# Patient Record
Sex: MALE | Race: BLACK OR AFRICAN AMERICAN | NOT HISPANIC OR LATINO | URBAN - METROPOLITAN AREA
[De-identification: names, ages, dates, MRNs, and addresses within clinical notes are randomized per-mention and may not be internally consistent; named-entity substitution may affect disease eponyms.]

---

## 2017-01-06 ENCOUNTER — EMERGENCY (EMERGENCY)
Facility: HOSPITAL | Age: 52
LOS: 1 days | Discharge: HOME | End: 2017-01-06
Attending: EMERGENCY MEDICINE | Admitting: EMERGENCY MEDICINE
Payer: MEDICAID

## 2017-01-06 VITALS
RESPIRATION RATE: 17 BRPM | HEIGHT: 69 IN | DIASTOLIC BLOOD PRESSURE: 86 MMHG | WEIGHT: 169.98 LBS | TEMPERATURE: 98 F | HEART RATE: 82 BPM | OXYGEN SATURATION: 100 % | SYSTOLIC BLOOD PRESSURE: 148 MMHG

## 2017-01-06 DIAGNOSIS — Z98.89 OTHER SPECIFIED POSTPROCEDURAL STATES: Chronic | ICD-10-CM

## 2017-01-06 DIAGNOSIS — M62.830 MUSCLE SPASM OF BACK: ICD-10-CM

## 2017-01-06 DIAGNOSIS — Z79.899 OTHER LONG TERM (CURRENT) DRUG THERAPY: ICD-10-CM

## 2017-01-06 DIAGNOSIS — M54.9 DORSALGIA, UNSPECIFIED: ICD-10-CM

## 2017-01-06 DIAGNOSIS — Z88.0 ALLERGY STATUS TO PENICILLIN: ICD-10-CM

## 2017-01-06 PROCEDURE — 99284 EMERGENCY DEPT VISIT MOD MDM: CPT

## 2017-01-06 RX ORDER — BENZTROPINE MESYLATE 1 MG
1 TABLET ORAL ONCE
Qty: 0 | Refills: 0 | Status: COMPLETED | OUTPATIENT
Start: 2017-01-06 | End: 2017-01-06

## 2017-01-06 RX ORDER — KETOROLAC TROMETHAMINE 30 MG/ML
60 SYRINGE (ML) INJECTION ONCE
Qty: 0 | Refills: 0 | Status: DISCONTINUED | OUTPATIENT
Start: 2017-01-06 | End: 2017-01-06

## 2017-01-06 RX ADMIN — Medication 60 MILLIGRAM(S): at 21:35

## 2017-01-06 NOTE — ED PROVIDER NOTE - OBJECTIVE STATEMENT
52 y/o M with PMH of Gastritis, OA, ETOH abuse, Drug abuse, Schizoaffective D/O and Bipolar D/O (on Seroquel and Sertraline) presents c/o atraumatic "lower back spasms" x 1 day. Sx's began while pt was at rest this morning. Worse when ambulating, no alleviating factors. Has not taken and meds for his sx's PTA. States he has had similar episodes in the past with spontaneous resolution.    Denies fever, chills, dizziness, headache, CP, SOB, palpitations, abdo pain, N/V/D, dysuria, hematuria, urinary or bowel incontinence, LE numbness or focal weakness, recent injury/trauma 52 y/o M with PMH of Gastritis, OA, ETOH abuse, Drug abuse, Schizoaffective D/O and Bipolar D/O (on Seroquel and Sertraline) presents c/o atraumatic "lower back spasms" x 1 day. Sx's began while pt was at rest this morning. Worse when ambulating, no alleviating factors. Has not taken and meds for his sx's PTA. States he has had similar episodes in the past in which he was told it was a "neurologic problem in the brain".     Denies fever, chills, dizziness, headache, CP, SOB, palpitations, abdo pain, N/V/D, dysuria, hematuria, urinary or bowel incontinence, LE numbness or focal weakness, recent injury/trauma

## 2017-01-06 NOTE — ED PROVIDER NOTE - MEDICAL DECISION MAKING DETAILS
Pt improved with meds and now reports feeling much better. A&Ox3. NAD. Ambulates steady without difficulty. No other complaints at this time. Will D/C with instructions to F/U with PMD or Dr. Thompson this week. Strict return precautions reviewed with pt in which pt verbalizes understanding and agrees to.

## 2017-01-06 NOTE — ED PROVIDER NOTE - PMH
Alcohol abuse    Arthritis    Bipolar disorder    Drug abuse  K2  Gastritis    Schizoaffective disorder

## 2017-01-07 VITALS
OXYGEN SATURATION: 97 % | DIASTOLIC BLOOD PRESSURE: 83 MMHG | RESPIRATION RATE: 16 BRPM | TEMPERATURE: 98 F | HEART RATE: 90 BPM | SYSTOLIC BLOOD PRESSURE: 137 MMHG

## 2017-01-07 RX ADMIN — Medication 1 MILLIGRAM(S): at 02:53

## 2017-12-18 ENCOUNTER — EMERGENCY (EMERGENCY)
Facility: HOSPITAL | Age: 52
LOS: 1 days | Discharge: HOME | End: 2017-12-18
Attending: EMERGENCY MEDICINE | Admitting: EMERGENCY MEDICINE
Payer: MEDICAID

## 2017-12-18 VITALS
RESPIRATION RATE: 16 BRPM | TEMPERATURE: 98 F | OXYGEN SATURATION: 99 % | SYSTOLIC BLOOD PRESSURE: 121 MMHG | DIASTOLIC BLOOD PRESSURE: 60 MMHG | HEART RATE: 98 BPM

## 2017-12-18 DIAGNOSIS — R41.82 ALTERED MENTAL STATUS, UNSPECIFIED: ICD-10-CM

## 2017-12-18 DIAGNOSIS — Z79.899 OTHER LONG TERM (CURRENT) DRUG THERAPY: ICD-10-CM

## 2017-12-18 DIAGNOSIS — F10.129 ALCOHOL ABUSE WITH INTOXICATION, UNSPECIFIED: ICD-10-CM
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
## 2017-12-18 DIAGNOSIS — Z88.0 ALLERGY STATUS TO PENICILLIN: ICD-10-CM

## 2017-12-18 DIAGNOSIS — Z98.89 OTHER SPECIFIED POSTPROCEDURAL STATES: Chronic | ICD-10-CM

## 2017-12-18 LAB
ALBUMIN SERPL ELPH-MCNC: 4 G/DL — SIGNIFICANT CHANGE UP (ref 3.4–5)
ALP SERPL-CCNC: 70 U/L — SIGNIFICANT CHANGE UP (ref 40–120)
ALT FLD-CCNC: 60 U/L — HIGH (ref 12–42)
ANION GAP SERPL CALC-SCNC: 14 MMOL/L — SIGNIFICANT CHANGE UP (ref 9–16)
AST SERPL-CCNC: 55 U/L — HIGH (ref 15–37)
BILIRUB SERPL-MCNC: 0.3 MG/DL — SIGNIFICANT CHANGE UP (ref 0.2–1.2)
BUN SERPL-MCNC: 9 MG/DL — SIGNIFICANT CHANGE UP (ref 7–23)
CALCIUM SERPL-MCNC: 8.9 MG/DL — SIGNIFICANT CHANGE UP (ref 8.5–10.5)
CHLORIDE SERPL-SCNC: 106 MMOL/L — SIGNIFICANT CHANGE UP (ref 96–108)
CO2 SERPL-SCNC: 26 MMOL/L — SIGNIFICANT CHANGE UP (ref 22–31)
CREAT SERPL-MCNC: 0.72 MG/DL — SIGNIFICANT CHANGE UP (ref 0.5–1.3)
ETHANOL SERPL-MCNC: 347 MG/DL — HIGH
GLUCOSE BLDC GLUCOMTR-MCNC: 67 MG/DL — LOW (ref 70–99)
GLUCOSE SERPL-MCNC: 68 MG/DL — LOW (ref 70–99)
POTASSIUM SERPL-MCNC: 3.7 MMOL/L — SIGNIFICANT CHANGE UP (ref 3.5–5.3)
POTASSIUM SERPL-SCNC: 3.7 MMOL/L — SIGNIFICANT CHANGE UP (ref 3.5–5.3)
PROT SERPL-MCNC: 8 G/DL — SIGNIFICANT CHANGE UP (ref 6.4–8.2)
SODIUM SERPL-SCNC: 146 MMOL/L — HIGH (ref 132–145)

## 2017-12-18 PROCEDURE — 99284 EMERGENCY DEPT VISIT MOD MDM: CPT

## 2017-12-18 RX ORDER — DEXTROSE 50 % IN WATER 50 %
25 SYRINGE (ML) INTRAVENOUS ONCE
Qty: 0 | Refills: 0 | Status: COMPLETED | OUTPATIENT
Start: 2017-12-18 | End: 2017-12-18

## 2017-12-18 RX ADMIN — Medication 25 MILLILITER(S): at 21:31

## 2017-12-18 NOTE — ED PROVIDER NOTE - UNABLE TO OBTAIN
Arrives with altered mental status. Patient unable to provide a history or cooperate with physical exam. Urgent need for Intervention

## 2017-12-18 NOTE — ED PROVIDER NOTE - OBJECTIVE STATEMENT
Arrives with altered mental status. Patient unable to provide a history or cooperate with physical exam.

## 2017-12-19 VITALS
DIASTOLIC BLOOD PRESSURE: 82 MMHG | SYSTOLIC BLOOD PRESSURE: 127 MMHG | OXYGEN SATURATION: 97 % | TEMPERATURE: 98 F | RESPIRATION RATE: 16 BRPM | HEART RATE: 83 BPM

## 2017-12-19 PROBLEM — F25.9 SCHIZOAFFECTIVE DISORDER, UNSPECIFIED: Chronic | Status: ACTIVE | Noted: 2017-01-06

## 2017-12-19 PROBLEM — F31.9 BIPOLAR DISORDER, UNSPECIFIED: Chronic | Status: ACTIVE | Noted: 2017-01-06

## 2017-12-19 PROBLEM — K29.70 GASTRITIS, UNSPECIFIED, WITHOUT BLEEDING: Chronic | Status: ACTIVE | Noted: 2017-01-06

## 2017-12-19 LAB
GLUCOSE BLDC GLUCOMTR-MCNC: 72 MG/DL — SIGNIFICANT CHANGE UP (ref 70–99)
GLUCOSE BLDC GLUCOMTR-MCNC: 81 MG/DL — SIGNIFICANT CHANGE UP (ref 70–99)

## 2018-01-02 ENCOUNTER — EMERGENCY (EMERGENCY)
Facility: HOSPITAL | Age: 53
LOS: 1 days | Discharge: HOME | End: 2018-01-02
Attending: EMERGENCY MEDICINE | Admitting: EMERGENCY MEDICINE
Payer: MEDICAID

## 2018-01-02 VITALS
RESPIRATION RATE: 17 BRPM | HEART RATE: 78 BPM | SYSTOLIC BLOOD PRESSURE: 139 MMHG | DIASTOLIC BLOOD PRESSURE: 80 MMHG | OXYGEN SATURATION: 99 %

## 2018-01-02 VITALS
SYSTOLIC BLOOD PRESSURE: 144 MMHG | DIASTOLIC BLOOD PRESSURE: 78 MMHG | RESPIRATION RATE: 18 BRPM | TEMPERATURE: 98 F | HEART RATE: 81 BPM | OXYGEN SATURATION: 98 %

## 2018-01-02 DIAGNOSIS — F10.129 ALCOHOL ABUSE WITH INTOXICATION, UNSPECIFIED: ICD-10-CM

## 2018-01-02 DIAGNOSIS — Z98.89 OTHER SPECIFIED POSTPROCEDURAL STATES: Chronic | ICD-10-CM

## 2018-01-02 DIAGNOSIS — Z79.899 OTHER LONG TERM (CURRENT) DRUG THERAPY: ICD-10-CM

## 2018-01-02 DIAGNOSIS — Z88.0 ALLERGY STATUS TO PENICILLIN: ICD-10-CM

## 2018-01-02 DIAGNOSIS — R41.82 ALTERED MENTAL STATUS, UNSPECIFIED: ICD-10-CM

## 2018-01-02 PROCEDURE — 99284 EMERGENCY DEPT VISIT MOD MDM: CPT

## 2018-01-02 RX ORDER — DIVALPROEX SODIUM 500 MG/1
0 TABLET, DELAYED RELEASE ORAL
Qty: 0 | Refills: 0 | COMMUNITY

## 2018-01-02 RX ORDER — QUETIAPINE FUMARATE 200 MG/1
0 TABLET, FILM COATED ORAL
Qty: 0 | Refills: 0 | COMMUNITY

## 2018-01-02 NOTE — ED PROVIDER NOTE - OBJECTIVE STATEMENT
Patient BIBEMS from suspected alcohol intoxication without seizure, vomiting, abnormal vitals. Previous ED visits related to alcohol intoxication.  History and ROS limited due to current state
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
## 2018-01-13 NOTE — EKG
Test Reason : 

Blood Pressure : ***/*** mmHG

Vent. Rate : 083 BPM     Atrial Rate : 083 BPM

   P-R Int : 148 ms          QRS Dur : 076 ms

    QT Int : 394 ms       P-R-T Axes : 039 054 047 degrees

   QTc Int : 462 ms

 

NORMAL SINUS RHYTHM WITH SINUS ARRHYTHMIA

MODERATE VOLTAGE CRITERIA FOR LVH, MAY BE NORMAL VARIANT

CANNOT RULE OUT SEPTAL INFARCT , AGE UNDETERMINED

ABNORMAL ECG

NO PREVIOUS ECGS AVAILABLE

Confirmed by SHERLEY ISIDRO MD ([id number]) on 1/13/2018 4:58:07 PM

 

Referred By:             Confirmed By:SHERLEY ISIDRO MD

## 2018-01-13 NOTE — PN
S CIWA





- CIWA Score


Nausea/Vomitin


Muscle Tremors: 3


Anxiety: 3


Agitation: 2


Paroxysmal Sweats: 3


Orientation: 0-Oriented


Tacttile Disturbances: 1-Very Mild Itch/Numbness


Auditory Disturbances: 1-Very Mild


Visual Disturbances: 1-Very Mild Sensitivity


Headache: 2-Mild


CIWA-Ar Total Score: 18





[facility] Progress Note (SOAP)


Subjective: 





Shakes, sweats, irritability, abdominal cramps and leg pain


Objective: 





18 10:32


 Vital Signs











  18





  03:26 06:22 09:06


 


Temperature  97.7 F 96.3 F L


 


Pulse Rate  77 120 H


 


Respiratory 18 18 20





Rate   


 


Blood Pressure  117/73 128/87








 Laboratory Last Values











Urine Color  Yellow   18  21:00    


 


Urine Appearance  Turbid   18  21:00    


 


Urine pH  5.0  (5.0-8.0)   18  21:00    


 


Ur Specific Gravity  1.019  (1.001-1.035)   18  21:00    


 


Urine Protein  Negative  (NEGATIVE)   18  21:00    


 


Urine Glucose (UA)  Negative  (NEGATIVE)   18  21:00    


 


Urine Ketones  Trace  (NEGATIVE)  H  18  21:00    


 


Urine Blood  Negative  (NEGATIVE)   18  21:00    


 


Urine Nitrite  Negative  (NEGATIVE)   18  21:00    


 


Urine Bilirubin  Negative  (NEGATIVE)   18  21:00    


 


Urine Urobilinogen  2.0 mg/dL (0.2-1.0)   18  21:00    


 


Ur Leukocyte Esterase  Negative  (NEGATIVE)   18  21:00    








UA noted, labs pending


Assessment: 





18 10:32


Withdrawal sx


Plan: 





Continue detox

## 2018-01-14 NOTE — PN
Psychiatric Progress Note


Vital Signs: 


 Vital Signs











 Period  Temp  Pulse  Resp  BP Sys/Toscano  Pulse Ox


 


 Last 24 Hr  97.2 F-98.3 F  67-96  16-20  108-135/62-80  











Date of Session: 01/14/18


Chief Complaint:: Follow up


HPI: Patient with history of Schizoaffective Disorder, pplysubstance dependence 

admitted on 1/12/12 for alcohol detoxification


Current Medications: 


Active Medications











Generic Name Dose Route Start Last Admin





  Trade Name Freq  PRN Reason Stop Dose Admin


 


Acetaminophen  650 mg  01/12/18 14:28  





  Tylenol -  PO   





  Q4H PRN   





  FEVER   


 


Al Hydroxide/Mg Hydroxide  30 ml  01/12/18 14:28  





  Mylanta Oral Suspension -  PO   





  Q6H PRN   





  DYSPEPSIA   


 


Chlordiazepoxide HCl  25 mg  01/13/18 17:00  01/14/18 06:22





  Librium -  PO  01/14/18 11:01  25 mg





  [id number] MARYCARMEN   Administration


 


Chlordiazepoxide HCl  15 mg  01/14/18 17:00  





  Librium -  PO  01/15/18 11:01  





  [id number] MARYCARMEN   


 


Chlordiazepoxide HCl  25 mg  01/12/18 14:28  01/13/18 07:29





  Librium -  PO  01/15/18 14:27  25 mg





  Q4H PRN   Administration





  WITHDRAWAL(CONT SUBST)   


 


Chlordiazepoxide HCl  10 mg  01/15/18 17:00  





  Librium -  PO  01/16/18 11:01  





  [id number] MARYCARMEN   


 


Divalproex Sodium  250 mg  01/13/18 10:00  01/13/18 22:13





  Depakote -  PO   250 mg





  BID MARYCARMEN   Administration


 


Docusate Sodium  100 mg  01/12/18 22:00  01/14/18 06:23





  Colace -  PO   100 mg





  TID MARYCARMEN   Administration


 


Eucalyptus/Menthol/Phenol/Sorbitol  1 each  01/12/18 14:28  





  Cepastat Lozenge -  MM   





  Q4H PRN   





  SORE THROAT   


 


Guaifenesin  10 ml  01/12/18 14:28  





  Robitussin Dm -  PO   





  Q6H PRN   





  COUGH   


 


Hydroxyzine Pamoate  50 mg  01/12/18 14:28  01/13/18 22:19





  Vistaril -  PO   50 mg





  Q4H PRN   Administration





  AGITATION   


 


Ibuprofen  400 mg  01/12/18 14:28  01/14/18 06:23





  Motrin -  PO   400 mg





  Q6H PRN   Administration





  PAIN LEVEL 4-6   


 


Loperamide HCl  4 mg  01/12/18 14:28  





  Imodium -  PO   





  Q6H PRN   





  DIARRHEA   


 


Magnesium Citrate  300 ml  01/12/18 14:28  





  Citroma -  PO   





  Q48H PRN   





  CONSTIPATION   


 


Magnesium Hydroxide  30 ml  01/12/18 14:28  





  Milk Of Magnesia -  PO   





  DAILY PRN   





  CONSTIPATION   


 


Pantoprazole Sodium  40 mg  01/14/18 07:00  01/14/18 08:19





  Protonix -  PO   40 mg





  DAILY@0700 MARYCARMEN   Administration


 


Prenatal Multivit/Folic Acid/Iron  1 tab  01/13/18 10:00  01/13/18 10:05





  Prenatal Vitamins (Sjr) -  PO   1 tab





  DAILY MARYCARMEN   Administration


 


Pseudoephedrine/Triprolidine  1 combo  01/12/18 14:28  





  Actifed -  PO   





  TID PRN   





  NASAL CONGESTION   


 


Quetiapine Fumarate  200 mg  01/12/18 22:00  01/13/18 22:13





  Seroquel -  PO   200 mg





  HS MARYCARMEN   Administration


 


Sertraline HCl  100 mg  01/13/18 10:00  01/13/18 10:05





  Zoloft -  PO   100 mg





  DAILY MARYCARMEN   Administration


 


Thiamine HCl  100 mg  01/12/18 22:00  01/13/18 22:13





  Vitamin B1 -  PO   100 mg





  HS MARYCARMEN   Administration











Medication(s) Change(s): Increase Seroquel dosage to 100 mg daily & 300 mg HS


Current Side Effect: No


Lab tests ordered: Yes


Lab tests reviewed: Yes


Provider note:: Patient requests that his Seroquel dosage be adjusted. He said 

that he was on Seroquel 200 mg daily & 400 mg HS prior to current admission. He 

saw a  psychiatrist on admission who ordered Seroquel 200 mg po HS with the 

promise to gradually raise dosage to prior to admission dose. He complains that 

he is still geeting 200 mg po HS. Requests that Seroquel be increased to 100 mg 

daily & 300 mg HS


Total face to face time:: 25





Mental Status Exam





- Mental Status Exam


Alert and Oriented to: Time, Place, Person


Cognitive Function: Fair


Mood: Hopeful, Euthymic


Affect: Appropriate


Patient Behavior: Cooperative


Speech Pattern: Clear


Voice Loudness: Normal


Thought Process: Intact, Goal Oriented


Hallucinations: Denies


Homicidal Ideation: Denies


Insight/Judgement: Fair, Poor


Sleep: Poorly


Appetite: Good


Muscle strength/Tone: Normal


Gait/Station: Normal





Psychiatric Treatment Plan





- Problem List


(1) Schizoaffective disorder


Current Visit: Yes   





(2) Alcohol dependence with uncomplicated withdrawal


Current Visit: Yes   





(3) Cannabis dependence


Current Visit: Yes   





(4) Nicotine dependence


Current Visit: Yes   


Qualifiers: 


   Nicotine product type: cigarettes   Substance use status: uncomplicated   

Qualified Code(s): F17.210 - Nicotine dependence, cigarettes, uncomplicated   


Initial treatment plan: 1) Discontinue Seroquel 200 mg po HS.  2) Start 

Seroquel 100 mg daily & 300 mg HS.  3) Continue inpatient detoxification

## 2018-01-15 NOTE — PN
[facility]HS Progress Note (SOAP)


Subjective: 





ANXIETY,SWEATS, ITCHY RASH BOTH SHOULDERS.


Objective: 





01/15/18 11:43


 Vital Signs











Temperature  96.4 F L  01/15/18 09:19


 


Pulse Rate  100 H  01/15/18 09:19


 


Respiratory Rate  18   01/15/18 09:19


 


Blood Pressure  126/76   01/15/18 09:19


 


O2 Sat by Pulse Oximetry (%)      








 Laboratory Last Values











WBC  5.2 K/mm3 (4.0-10.0)   01/13/18  06:00    


 


RBC  4.11 M/mm3 (4.00-5.60)   01/13/18  06:00    


 


Hgb  12.7 GM/dL (11.7-16.9)   01/13/18  06:00    


 


Hct  38.5 % (35.4-49)   01/13/18  06:00    


 


MCV  93.7 fl (80-96)   01/13/18  06:00    


 


MCH  31.0 pg (25.7-33.7)   01/13/18  06:00    


 


MCHC  33.0 g/dl (32.0-35.9)   01/13/18  06:00    


 


RDW  15.8 % (11.9-15.9)   01/13/18  06:00    


 


Plt Count  160 K/MM3 (134-434)   01/13/18  06:00    


 


MPV  9.7 fl (7.5-11.1)   01/13/18  06:00    


 


Sodium  136 mmol/L (136-145)   01/13/18  06:00    


 


Potassium  4.0 mmol/L (3.5-5.1)   01/13/18  06:00    


 


Chloride  100 mmol/L ()   01/13/18  06:00    


 


Carbon Dioxide  28 mmol/L (21-32)   01/13/18  06:00    


 


Anion Gap  8  (8-16)   01/13/18  06:00    


 


BUN  10 mg/dL (7-18)  D 01/13/18  06:00    


 


Creatinine  0.8 mg/dL (0.7-1.3)   01/13/18  06:00    


 


Creat Clearance w eGFR  > 60  (>60)   01/13/18  06:00    


 


Random Glucose  112 mg/dL ()  H  01/13/18  06:00    


 


Calcium  8.4 mg/dL (8.5-10.1)  L  01/13/18  06:00    


 


Total Bilirubin  0.6 mg/dL (0.2-1.0)   01/13/18  06:00    


 


AST  55 U/L (15-37)  H D 01/13/18  06:00    


 


ALT  68 U/L (12-78)  D 01/13/18  06:00    


 


Alkaline Phosphatase  81 U/L ()  D 01/13/18  06:00    


 


Total Protein  8.1 g/dl (6.4-8.2)   01/13/18  06:00    


 


Albumin  4.4 g/dl (3.4-5.0)   01/13/18  06:00    


 


Urine Color  Yellow   01/12/18  21:00    


 


Urine Appearance  Turbid   01/12/18  21:00    


 


Urine pH  5.0  (5.0-8.0)   01/12/18  21:00    


 


Ur Specific Gravity  1.019  (1.001-1.035)   01/12/18  21:00    


 


Urine Protein  Negative  (NEGATIVE)   01/12/18  21:00    


 


Urine Glucose (UA)  Negative  (NEGATIVE)   01/12/18  21:00    


 


Urine Ketones  Trace  (NEGATIVE)  H  01/12/18  21:00    


 


Urine Blood  Negative  (NEGATIVE)   01/12/18  21:00    


 


Urine Nitrite  Negative  (NEGATIVE)   01/12/18  21:00    


 


Urine Bilirubin  Negative  (NEGATIVE)   01/12/18  21:00    


 


Urine Urobilinogen  2.0 mg/dL (0.2-1.0)   01/12/18  21:00    


 


Ur Leukocyte Esterase  Negative  (NEGATIVE)   01/12/18  21:00    


 


Valproic Acid  < 3.000 ug/ml ()  L  01/13/18  09:00    


 


RPR Titer  Nonreactive  (NONREACTIVE)   01/13/18  06:00    











Assessment: 





01/15/18 11:43


WITHDRAWAL SX


Plan: 





CONTINUE DETOX

## 2018-01-16 NOTE — DS
[facility] Detox Discharge Summary


Admission Date: 


01/12/18





Discharge Date: 01/16/18





- History


Present History: Alcohol Dependence, Cannabis Dependence


Additional Comments: 





DETOX COMPLETED. ALERT O X 3. NAD.


Pertinent Past History: 





SEE DX BELOW





- Physical Exam Results


Vital Signs: 


 Vital Signs











Temperature  96.2 F L  01/16/18 10:08


 


Pulse Rate  95 H  01/16/18 10:08


 


Respiratory Rate  18   01/16/18 10:08


 


Blood Pressure  133/87   01/16/18 10:08


 


O2 Sat by Pulse Oximetry (%)      











Pertinent Admission Physical Exam Findings: 





WITHDRAWAL SX 


 Laboratory Last Values











WBC  5.2 K/mm3 (4.0-10.0)   01/13/18  06:00    


 


RBC  4.11 M/mm3 (4.00-5.60)   01/13/18  06:00    


 


Hgb  12.7 GM/dL (11.7-16.9)   01/13/18  06:00    


 


Hct  38.5 % (35.4-49)   01/13/18  06:00    


 


MCV  93.7 fl (80-96)   01/13/18  06:00    


 


MCH  31.0 pg (25.7-33.7)   01/13/18  06:00    


 


MCHC  33.0 g/dl (32.0-35.9)   01/13/18  06:00    


 


RDW  15.8 % (11.9-15.9)   01/13/18  06:00    


 


Plt Count  160 K/MM3 (134-434)   01/13/18  06:00    


 


MPV  9.7 fl (7.5-11.1)   01/13/18  06:00    


 


Sodium  136 mmol/L (136-145)   01/13/18  06:00    


 


Potassium  4.0 mmol/L (3.5-5.1)   01/13/18  06:00    


 


Chloride  100 mmol/L ()   01/13/18  06:00    


 


Carbon Dioxide  28 mmol/L (21-32)   01/13/18  06:00    


 


Anion Gap  8  (8-16)   01/13/18  06:00    


 


BUN  10 mg/dL (7-18)  D 01/13/18  06:00    


 


Creatinine  0.8 mg/dL (0.7-1.3)   01/13/18  06:00    


 


Creat Clearance w eGFR  > 60  (>60)   01/13/18  06:00    


 


Random Glucose  112 mg/dL ()  H  01/13/18  06:00    


 


Calcium  8.4 mg/dL (8.5-10.1)  L  01/13/18  06:00    


 


Total Bilirubin  0.6 mg/dL (0.2-1.0)   01/13/18  06:00    


 


AST  55 U/L (15-37)  H D 01/13/18  06:00    


 


ALT  68 U/L (12-78)  D 01/13/18  06:00    


 


Alkaline Phosphatase  81 U/L ()  D 01/13/18  06:00    


 


Total Protein  8.1 g/dl (6.4-8.2)   01/13/18  06:00    


 


Albumin  4.4 g/dl (3.4-5.0)   01/13/18  06:00    


 


Urine Color  Yellow   01/12/18  21:00    


 


Urine Appearance  Turbid   01/12/18  21:00    


 


Urine pH  5.0  (5.0-8.0)   01/12/18  21:00    


 


Ur Specific Gravity  1.019  (1.001-1.035)   01/12/18  21:00    


 


Urine Protein  Negative  (NEGATIVE)   01/12/18  21:00    


 


Urine Glucose (UA)  Negative  (NEGATIVE)   01/12/18  21:00    


 


Urine Ketones  Trace  (NEGATIVE)  H  01/12/18  21:00    


 


Urine Blood  Negative  (NEGATIVE)   01/12/18  21:00    


 


Urine Nitrite  Negative  (NEGATIVE)   01/12/18  21:00    


 


Urine Bilirubin  Negative  (NEGATIVE)   01/12/18  21:00    


 


Urine Urobilinogen  2.0 mg/dL (0.2-1.0)   01/12/18  21:00    


 


Ur Leukocyte Esterase  Negative  (NEGATIVE)   01/12/18  21:00    


 


Valproic Acid  < 3.000 ug/ml ()  L  01/13/18  09:00    


 


RPR Titer  Nonreactive  (NONREACTIVE)   01/13/18  06:00    














- Treatment


Hospital Course: Detox Protocol Followed, Detoxed Safely, Responded well, 

Discharged Condition Good





- Medication


Discharge Medications: 


Ambulatory Orders





Docusate Sodium [Colace -] 100 mg PO TID #90 capsule 03/28/14 


Esomeprazole Mag Trihydrate [Nexium] 40 mg PO DAILY #30 capsule.ec 03/28/14 


Divalproex [Depakote -] 500 mg PO BID #60 tablet.ec 04/16/15 


Quetiapine Fumarate [Seroquel -] 200 mg PO DAILY #30 tablet 04/16/15 


Quetiapine Fumarate [Seroquel -] 400 mg PO HS #30 tablet 04/16/15 


Trazodone HCl [Desyrel -] 200 mg PO HS #30 tablet 04/16/15 


Sertraline HCl [Zoloft -] 100 mg PO DAILY 01/12/18 











- Diagnosis


(1) Alcohol dependence with uncomplicated withdrawal


Current Visit: Yes   Status: Acute   





(2) Use of cane as ambulatory aid


Current Visit: Yes   Status: Chronic   





(3) Nicotine dependence


Current Visit: Yes   Status: Acute   


Qualifiers: 


   Nicotine product type: cigarettes   Substance use status: uncomplicated   

Qualified Code(s): F17.210 - Nicotine dependence, cigarettes, uncomplicated   





(4) ARTHRITS


Current Visit: Yes   Status: Chronic   





(5) GERD (gastroesophageal reflux disease)


Current Visit: Yes   Status: Chronic   


Qualifiers: 


   Esophagitis presence: esophagitis presence not specified   Qualified Code(s)

: K21.9 - Gastro-esophageal reflux disease without esophagitis   





(6) History of total bilateral knee replacement


Current Visit: Yes   Status: Chronic   





(7) Constipation by delayed colonic transit


Current Visit: Yes   Status: Chronic   





- AMA


Did Patient Leave Against Medical Advice: No
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
## 2018-01-16 NOTE — HP
[facility] MD Rehab Assess/Revision





- Admission History


Admitted to Rehab from: [facility]


Date of Admission to Rehab: 01/16/18





- Vital signs


Vital Signs: 


 Vital Signs











 Period  Temp  Pulse  Resp  BP Sys/Toscano  Pulse Ox


 


 Last 24 Hr  97.3 F    18  121/73  














- Findings


Detox History & Physical reviewed: Yes


Concur with findings: Yes


Comments/Additional Findings: transferred from detox to rehab admission as per 

protocol





Inpatient Rehab Admission





- Initial Determination


Are CD services needed?: Yes


Free of communicable disease: Yes


Not in need of hospitalization: Yes





- Rehab Admission Criteria


Previous failed treatment: Yes


Poor recovery environment: Yes


Comorbidities: Yes


Lacks judgement: No


Patient is meeting Inpatient Rehab admission criteria:: Yes

## 2018-01-17 NOTE — PN
[facility]HS Progress Note (SOAP)


Subjective: 





c/o constipation


Objective: 





01/17/18 14:36


 Vital Signs - 24 hr











  01/17/18 01/17/18 01/17/18





  00:30 03:30 06:38


 


Temperature   97.6 F


 


Pulse Rate   116 H


 


Respiratory 20 20 18





Rate   


 


Blood Pressure   122/81








 Laboratory Tests











  01/17/18





  05:45


 


HIV 1&2 Antibody Screen  Negative


 


HIV P24 Antigen  Negative








labs reveiwed


Assessment: 





01/17/18 14:37


constipation 2/2 medication colace and senna qhs

## 2018-01-17 NOTE — PN
[facility]HS Progress Note


Note: 





patient reports was on Zoloft 200 mg po daily and asked to adjust the dosage, 

will increase to 200 mg po daily.

## 2018-01-26 NOTE — PN
[facility] Progress Note (SOAP)


Subjective: 





still having constipation most likely from medications, colace and seenna as 

prescibed nto effective requesting lactulose daily


Objective: 





01/26/18 12:27


 Vital Signs - 24 hr











  01/26/18





  03:30


 


Respiratory 20





Rate 








 Laboratory Tests











  01/17/18





  05:45


 


HIV 1&2 Antibody Screen  Negative


 


HIV P24 Antigen  Negative








labs reviewed


Assessment: 





01/26/18 12:27


constipation - add lactulaose daily, 1 dose now, then nightsly

## 2018-01-30 NOTE — PN
Psychiatric Progress Note


Vital Signs: 


 Vital Signs











 Period  Temp  Pulse  Resp  BP Sys/Toscano  Pulse Ox


 


 Last 24 Hr  97.3 F  81  18-18  141/84  











Date of Session: 01/30/18


Chief Complaint:: discharge visit


HPI: Patient has addressed alcohol, nicotine dependence comorbid 

schizoaffective disorder.


ROS: arthritis medically managed.


Current Medications: 


Active Medications











Generic Name Dose Route Start Last Admin





  Trade Name Freq  PRN Reason Stop Dose Admin


 


Acetaminophen  650 mg  01/16/18 16:18  01/19/18 07:01





  Tylenol -  PO   650 mg





  Q4H PRN   Administration





  FEVER   


 


Al Hydroxide/Mg Hydroxide  30 ml  01/16/18 16:18  01/18/18 00:27





  Mylanta Oral Suspension -  PO   30 ml





  Q6H PRN   Administration





  DYSPEPSIA   


 


Bacitracin  0.9 gm  01/25/18 11:30  01/29/18 21:30





  Bacitracin -  TP   0.9 gm





  BID MARYCARMEN   Administration


 


Divalproex Sodium  500 mg  01/16/18 22:00  01/29/18 21:29





  Depakote -  PO   500 mg





  BID MARYCARMEN   Administration


 


Docusate Sodium  300 mg  01/17/18 22:00  01/29/18 21:29





  Colace -  PO   300 mg





  HS MARYCARMEN   Administration


 


Eucalyptus/Menthol/Phenol/Sorbitol  1 each  01/16/18 16:18  





  Cepastat Lozenge -  MM   





  Q4H PRN   





  SORE THROAT   


 


Guaifenesin  10 ml  01/16/18 16:18  





  Robitussin Dm -  PO   





  Q6H PRN   





  COUGH   


 


Hydrocortisone  1 applic  01/17/18 14:00  01/29/18 10:21





  Hytone 0.5% Cream -  TP   1 applic





  DAILY PRN   Administration





  FOR ITCHING   


 


Lactulose  20 gm  01/26/18 22:00  01/29/18 21:30





  Cephulac (Oral Use)  PO   20 gm





  HS MARYCARMEN   Administration


 


Lidocaine  1 patch  01/17/18 14:00  01/29/18 10:19





  Lidoderm Patch -  TP   Not Given





  DAILY MARYCARMEN   


 


Loperamide HCl  4 mg  01/16/18 16:18  





  Imodium -  PO   





  Q6H PRN   





  DIARRHEA   


 


Magnesium Citrate  300 ml  01/16/18 16:18  





  Citroma -  PO   





  Q48H PRN   





  CONSTIPATION   


 


Magnesium Hydroxide  30 ml  01/16/18 16:18  01/25/18 21:41





  Milk Of Magnesia -  PO   30 ml





  DAILY PRN   Administration





  CONSTIPATION   


 


Miscellaneous  1 each  01/17/18 22:00  01/29/18 21:31





  Lidoderm Patch Removal  MC   1 each





  DAILY@2200 MARYCARMEN   Administration


 


Naproxen  500 mg  01/17/18 14:00  01/29/18 21:29





  Naprosyn -  PO   500 mg





  BID MARYCARMEN   Administration


 


Nicotine  14 mg  01/16/18 17:00  





  Nicoderm Patch -  TD   





  DAILY PRN   





  WITHDRAWAL(CONT SUBST)   


 


Nicotine Polacrilex  2 mg  01/16/18 16:18  





  Nicorette Gum -  BUC   





  Q2H PRN   





  NICOTINE REPLACEMENT RX   


 


Pantoprazole Sodium  40 mg  01/17/18 10:00  01/29/18 10:19





  Protonix -  PO   40 mg





  DAILY MARYCARMEN   Administration


 


Prenatal Multivit/Folic Acid/Iron  1 tab  01/17/18 10:00  01/29/18 10:19





  Prenatal Vitamins (Sjr) -  PO   1 tab





  DAILY MARYCARMEN   Administration


 


Pseudoephedrine/Triprolidine  1 combo  01/16/18 16:18  





  Actifed -  PO   





  TID PRN   





  NASAL CONGESTION   


 


Quetiapine Fumarate  400 mg  01/16/18 22:00  01/29/18 21:31





  Seroquel -  PO   400 mg





  HS MARYCARMEN   Administration


 


Quetiapine Fumarate  200 mg  01/17/18 10:00  01/29/18 10:19





  Seroquel -  PO   200 mg





  DAILY MARYCARMEN   Administration


 


Senna  2 tab  01/17/18 22:00  01/29/18 21:30





  Senna -  PO   2 tab





  HS MARYCARMEN   Administration


 


Sertraline HCl  200 mg  01/18/18 10:00  01/29/18 10:19





  Zoloft -  PO   200 mg





  DAILY MARYCARMEN   Administration


 


Thiamine HCl  100 mg  01/16/18 22:00  01/29/18 21:28





  Vitamin B1 -  PO   100 mg





  HS MARYCARMEN   Administration


 


Trazodone HCl  200 mg  01/16/18 22:00  01/29/18 21:29





  Desyrel -  PO   200 mg





  HS MARYCARMEN   Administration











Current Side Effect: No


Lab tests ordered: No


Lab tests reviewed: Yes


Provider note:: Patient has completed today his treatment and met his goals, 

will continue to address his issues at [facility], he gained insights into his 

addiction and motivated to continue maintain abstinence. He understands the 

negative consequences of his addiction and importance of changing attitudes for 

the utilization of supports to prevent relapses. Medication well tolerated(

depakote, seroquel, zoloft, trazodone) scripts provided, patient is stable for 

discharge today.


Total face to face time:: 20





Mental Status Exam





- Mental Status Exam


Alert and Oriented to: Time, Place, Person


Cognitive Function: Grossly Intact


Patient Appearance: Well Groomed


Mood: Hopeful


Affect: Appropriate, Mood Congruent


Patient Behavior: Appropriate, Cooperative


Speech Pattern: Clear, Appropriate


Voice Loudness: Normal


Thought Process: Intact, Goal Oriented


Thought Disorder: Not Present


Hallucinations: Denies


Suicidal Ideation: Denies


Homicidal Ideation: Denies


Insight/Judgement: Fair


Sleep: Fair


Appetite: Good


Muscle strength/Tone: Normal


Gait/Station: Normal





Psychiatric Treatment Plan





- Problem List


(1) Nicotine dependence


Current Visit: No   


Qualifiers: 


   Nicotine product type: cigarettes   Substance use status: uncomplicated   

Qualified Code(s): F17.210 - Nicotine dependence, cigarettes, uncomplicated   





(2) ARTHRITS


Current Visit: No   





(3) Alcohol dependence


Current Visit: No   





(4) GERD (gastroesophageal reflux disease)


Current Visit: No   


Qualifiers: 


   Esophagitis presence: esophagitis presence not specified   Qualified Code(s)

: K21.9 - Gastro-esophageal reflux disease without esophagitis   





(5) Schizoaffective disorder


Current Visit: No   





(6) Use of cane as ambulatory aid


Current Visit: No

## 2018-06-06 ENCOUNTER — EMERGENCY (EMERGENCY)
Facility: HOSPITAL | Age: 53
LOS: 1 days | Discharge: HOME | End: 2018-06-06
Attending: EMERGENCY MEDICINE | Admitting: EMERGENCY MEDICINE
Payer: COMMERCIAL

## 2018-06-06 VITALS
OXYGEN SATURATION: 97 % | RESPIRATION RATE: 15 BRPM | DIASTOLIC BLOOD PRESSURE: 79 MMHG | SYSTOLIC BLOOD PRESSURE: 126 MMHG | HEART RATE: 77 BPM

## 2018-06-06 VITALS
DIASTOLIC BLOOD PRESSURE: 100 MMHG | OXYGEN SATURATION: 93 % | TEMPERATURE: 98 F | SYSTOLIC BLOOD PRESSURE: 153 MMHG | HEART RATE: 94 BPM | RESPIRATION RATE: 14 BRPM

## 2018-06-06 DIAGNOSIS — Z88.0 ALLERGY STATUS TO PENICILLIN: ICD-10-CM

## 2018-06-06 DIAGNOSIS — R41.82 ALTERED MENTAL STATUS, UNSPECIFIED: ICD-10-CM

## 2018-06-06 DIAGNOSIS — Z79.899 OTHER LONG TERM (CURRENT) DRUG THERAPY: ICD-10-CM

## 2018-06-06 DIAGNOSIS — F10.129 ALCOHOL ABUSE WITH INTOXICATION, UNSPECIFIED: ICD-10-CM

## 2018-06-06 DIAGNOSIS — Z98.89 OTHER SPECIFIED POSTPROCEDURAL STATES: Chronic | ICD-10-CM

## 2018-06-06 PROCEDURE — 96372 THER/PROPH/DIAG INJ SC/IM: CPT

## 2018-06-06 PROCEDURE — 70450 CT HEAD/BRAIN W/O DYE: CPT | Mod: 26

## 2018-06-06 PROCEDURE — 99284 EMERGENCY DEPT VISIT MOD MDM: CPT

## 2018-06-06 PROCEDURE — 82962 GLUCOSE BLOOD TEST: CPT

## 2018-06-06 PROCEDURE — 99285 EMERGENCY DEPT VISIT HI MDM: CPT | Mod: 25

## 2018-06-06 PROCEDURE — 70450 CT HEAD/BRAIN W/O DYE: CPT

## 2018-06-06 RX ORDER — HALOPERIDOL DECANOATE 100 MG/ML
5 INJECTION INTRAMUSCULAR ONCE
Qty: 0 | Refills: 0 | Status: COMPLETED | OUTPATIENT
Start: 2018-06-06 | End: 2018-06-06

## 2018-06-06 RX ADMIN — Medication 2 MILLIGRAM(S): at 18:43

## 2018-06-06 RX ADMIN — HALOPERIDOL DECANOATE 5 MILLIGRAM(S): 100 INJECTION INTRAMUSCULAR at 18:43
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
## 2018-06-06 NOTE — ED ADULT NURSE NOTE - CHPI ED SYMPTOMS NEG
no dizziness/no fever/no tingling/no decreased eating/drinking/no weakness/no chills/no vomiting/no nausea/no numbness/no pain
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
## 2018-06-06 NOTE — ED ADULT NURSE NOTE - OBJECTIVE STATEMENT
pt BIBA to ed for alcohol intoxication. pt admits to drinking 1 pint vodka today. denies drug use. denies pain.

## 2018-06-06 NOTE — ED PROVIDER NOTE - PHYSICAL EXAMINATION
CON: agitated, awake, but not oriented, HENMT: no pooling of secretion, protecting airway, no facial ecchymosis or evidence of trauma, HEAD: no obvious hematoma or laceration, CV: rrr, RESP: chest rise, breathing spontaneously, GI: soft, SKIN: no laceration or abrasion noted, MSK: no deformity noted, NEURO: limited exam 2/2 mental status

## 2018-06-06 NOTE — ED ADULT NURSE REASSESSMENT NOTE - NS ED NURSE REASSESS COMMENT FT1
pt agitated, yelling out and curing. medicated per orders from Dr Armstrong.
Pt is ambulatory without assistance, was witnessed by various staff walking to ask other patients for card. Pt asked for sandwich and was given to him. Pt is aox4.
pt. is sleeping, breathing unlabored, O2 sat monitor in progress, O2 sat 100% on room air. will discharge when sober.
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
## 2018-11-18 ENCOUNTER — EMERGENCY (EMERGENCY)
Facility: HOSPITAL | Age: 53
LOS: 1 days | Discharge: HOME | End: 2018-11-18
Admitting: EMERGENCY MEDICINE
Payer: MEDICAID

## 2018-11-18 VITALS
TEMPERATURE: 98 F | OXYGEN SATURATION: 98 % | RESPIRATION RATE: 16 BRPM | HEART RATE: 68 BPM | DIASTOLIC BLOOD PRESSURE: 72 MMHG | SYSTOLIC BLOOD PRESSURE: 148 MMHG

## 2018-11-18 DIAGNOSIS — F10.120 ALCOHOL ABUSE WITH INTOXICATION, UNCOMPLICATED: ICD-10-CM

## 2018-11-18 DIAGNOSIS — Z88.0 ALLERGY STATUS TO PENICILLIN: ICD-10-CM

## 2018-11-18 DIAGNOSIS — Z98.89 OTHER SPECIFIED POSTPROCEDURAL STATES: Chronic | ICD-10-CM

## 2018-11-18 DIAGNOSIS — F17.200 NICOTINE DEPENDENCE, UNSPECIFIED, UNCOMPLICATED: ICD-10-CM

## 2018-11-18 DIAGNOSIS — R41.82 ALTERED MENTAL STATUS, UNSPECIFIED: ICD-10-CM

## 2018-11-18 DIAGNOSIS — Z79.899 OTHER LONG TERM (CURRENT) DRUG THERAPY: ICD-10-CM

## 2018-11-18 PROCEDURE — 99284 EMERGENCY DEPT VISIT MOD MDM: CPT

## 2018-11-18 NOTE — ED PROVIDER NOTE - PHYSICAL EXAMINATION
VITAL SIGNS: I have reviewed nursing notes and confirm.  CONSTITUTIONAL: Well-developed; well-nourished; in no acute distress.  SKIN: Skin is warm and dry, no acute rash. healed abrasion under right eye  HEAD: Normocephalic; atraumatic.  EYES: PERRL, EOM intact; conjunctiva and sclera clear. no subconjunctival hemorrhage   ENT: No nasal discharge; airway clear.  NECK: Supple; non tender.  CARD: S1, S2 normal; no murmurs, gallops, or rubs. Regular rate and rhythm.  RESP: No wheezes, rales or rhonchi.  ABD: Normal bowel sounds; soft; non-distended; non-tender;  no palpable or pulsating mass; no hepatosplenomegaly.  EXT: Normal ROM. No clubbing, cyanosis or edema.  NEURO: Alert, oriented. Grossly unremarkable.  PSYCH: Cooperative, appropriate.
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
## 2018-11-18 NOTE — ED PROVIDER NOTE - MEDICAL DECISION MAKING DETAILS
Alcohol intoxication, no signs of new trauma, healed abrasion, not hypoglycemic, neuro exam non-focal, will observe and reassess frequently.
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
## 2018-11-18 NOTE — ED ADULT NURSE NOTE - NSIMPLEMENTINTERV_GEN_ALL_ED
Implemented All Universal Safety Interventions:  [place] to call system. Call bell, personal items and telephone within reach. Instruct patient to call for assistance. Room bathroom lighting operational. Non-slip footwear when patient is off stretcher. Physically safe environment: no spills, clutter or unnecessary equipment. Stretcher in lowest position, wheels locked, appropriate side rails in place.

## 2018-11-18 NOTE — ED PROVIDER NOTE - OBJECTIVE STATEMENT
53 year old male with h/o alcohol abuse brought in by EMS for AMS secondary to alcohol intoxication. patient reports was drinking tonight in [facility]. patient with abrasion healed under right eye states he fell a few days ago, also reports had head CT two night ago at [facility].   patient requesting food. no other sign of trauma.

## 2018-11-18 NOTE — ED ADULT NURSE NOTE - CHIEF COMPLAINT QUOTE
Patient to alcohol ingestion, picked up at [facility], abrasion noted to right side of face

## 2018-11-19 PROBLEM — Z87.898 PERSONAL HISTORY OF OTHER SPECIFIED CONDITIONS: Chronic | Status: ACTIVE | Noted: 2018-01-03
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
## 2018-12-09 ENCOUNTER — EMERGENCY (EMERGENCY)
Facility: HOSPITAL | Age: 53
LOS: 1 days | Discharge: HOME | End: 2018-12-09
Admitting: EMERGENCY MEDICINE
Payer: MEDICAID

## 2018-12-09 VITALS
DIASTOLIC BLOOD PRESSURE: 92 MMHG | SYSTOLIC BLOOD PRESSURE: 136 MMHG | HEART RATE: 88 BPM | TEMPERATURE: 97 F | RESPIRATION RATE: 18 BRPM | OXYGEN SATURATION: 96 %

## 2018-12-09 DIAGNOSIS — Z98.89 OTHER SPECIFIED POSTPROCEDURAL STATES: Chronic | ICD-10-CM

## 2018-12-09 PROCEDURE — 99284 EMERGENCY DEPT VISIT MOD MDM: CPT

## 2018-12-09 RX ORDER — HALOPERIDOL DECANOATE 100 MG/ML
5 INJECTION INTRAMUSCULAR ONCE
Qty: 0 | Refills: 0 | Status: COMPLETED | OUTPATIENT
Start: 2018-12-09 | End: 2018-12-09

## 2018-12-09 RX ADMIN — HALOPERIDOL DECANOATE 5 MILLIGRAM(S): 100 INJECTION INTRAMUSCULAR at 21:42

## 2018-12-09 RX ADMIN — Medication 2 MILLIGRAM(S): at 21:43

## 2018-12-10 VITALS
SYSTOLIC BLOOD PRESSURE: 134 MMHG | DIASTOLIC BLOOD PRESSURE: 91 MMHG | TEMPERATURE: 98 F | OXYGEN SATURATION: 100 % | HEART RATE: 96 BPM | RESPIRATION RATE: 18 BRPM
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
## 2018-12-10 NOTE — ED PROVIDER NOTE - PHYSICAL EXAMINATION
General: yelling and combative, smells of alcohol  Head: NCAT  Eyes: PERRL  Heart: RRR  Lungs: CTAB  Abd: soft, NTND  Neuro: moves all 4 extremities equally  Skin: no e/o lacerations, abrasions, or ecchymoses

## 2018-12-10 NOTE — ED PROVIDER NOTE - MEDICAL DECISION MAKING DETAILS
intoxicated, sedated for safety.  pt observed in ed and dispo pending sobriety  At the time of discharge from the Emergency Department, the patient is alert with fluent appropriate speech and ambulatory without difficulty. A complete medical screening examination was performed and no emergency medical condition was identified.

## 2018-12-10 NOTE — ED ADULT NURSE NOTE - NSIMPLEMENTINTERV_GEN_ALL_ED
Implemented All Universal Safety Interventions:  [place] to call system. Call bell, personal items and telephone within reach. Instruct patient to call for assistance. Room bathroom lighting operational. Non-slip footwear when patient is off stretcher. Physically safe environment: no spills, clutter or unnecessary equipment. Stretcher in lowest position, wheels locked, appropriate side rails in place.
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
## 2018-12-13 DIAGNOSIS — Z79.899 OTHER LONG TERM (CURRENT) DRUG THERAPY: ICD-10-CM

## 2018-12-13 DIAGNOSIS — F10.129 ALCOHOL ABUSE WITH INTOXICATION, UNSPECIFIED: ICD-10-CM

## 2018-12-13 DIAGNOSIS — Z88.0 ALLERGY STATUS TO PENICILLIN: ICD-10-CM

## 2018-12-13 DIAGNOSIS — R41.82 ALTERED MENTAL STATUS, UNSPECIFIED: ICD-10-CM

## 2019-12-08 ENCOUNTER — EMERGENCY (EMERGENCY)
Facility: HOSPITAL | Age: 54
LOS: 1 days | Discharge: HOME | End: 2019-12-08
Attending: EMERGENCY MEDICINE | Admitting: EMERGENCY MEDICINE
Payer: MEDICAID

## 2019-12-08 VITALS
RESPIRATION RATE: 18 BRPM | HEART RATE: 98 BPM | SYSTOLIC BLOOD PRESSURE: 160 MMHG | WEIGHT: 179.9 LBS | OXYGEN SATURATION: 96 % | HEIGHT: 69 IN | DIASTOLIC BLOOD PRESSURE: 100 MMHG | TEMPERATURE: 99 F

## 2019-12-08 VITALS
OXYGEN SATURATION: 97 % | SYSTOLIC BLOOD PRESSURE: 134 MMHG | DIASTOLIC BLOOD PRESSURE: 73 MMHG | RESPIRATION RATE: 16 BRPM | TEMPERATURE: 98 F | HEART RATE: 100 BPM

## 2019-12-08 DIAGNOSIS — Z98.89 OTHER SPECIFIED POSTPROCEDURAL STATES: Chronic | ICD-10-CM

## 2019-12-08 LAB — GLUCOSE BLDC GLUCOMTR-MCNC: 92 MG/DL — SIGNIFICANT CHANGE UP (ref 70–99)

## 2019-12-08 PROCEDURE — 99220: CPT
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
## 2019-12-08 NOTE — ED PROVIDER NOTE - CLINICAL SUMMARY MEDICAL DECISION MAKING FREE TEXT BOX
Alcohol intoxication, no signs of trauma, not hypoglycemic, neuro exam non-focal, will observe and reassess ams, possibly 2/2 substance, no trauma noted or reported, protecting airway, will monitor for safety and reassessment for mental status

## 2019-12-08 NOTE — ED PROVIDER NOTE - OBJECTIVE STATEMENT
53 yo M [facility] EMS for ETOH intoxication, admits to ETOH today, no other complaints, unable to cooperate with remainder of history 55 yo M BIB EMS for ETOH intoxication, admits to ETOH today, no other complaints, unable to cooperate with remainder of history 2/2 mental status.  no trauma noted or reported
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
## 2019-12-08 NOTE — ED ADULT NURSE NOTE - NSIMPLEMENTINTERV_GEN_ALL_ED
Implemented All Fall with Harm Risk Interventions:  [place] to call system. Call bell, personal items and telephone within reach. Instruct patient to call for assistance. Room bathroom lighting operational. Non-slip footwear when patient is off stretcher. Physically safe environment: no spills, clutter or unnecessary equipment. Stretcher in lowest position, wheels locked, appropriate side rails in place. Provide visual cue, wrist band, yellow gown, etc. Monitor gait and stability. Monitor for mental status changes and reorient to person, place, and time. Review medications for side effects contributing to fall risk. Reinforce activity limits and safety measures with patient and family. Provide visual clues: red socks.

## 2019-12-08 NOTE — ED CDU PROVIDER DISPOSITION NOTE - CLINICAL COURSE
pt placed on obs for ams, likely 2/2 polysubstance use, now ao x 3, clear speech, steady gait, clinically sober, seeking dc, denies pain/trauma

## 2019-12-08 NOTE — ED ADULT NURSE NOTE - OBJECTIVE STATEMENT
55 yo M BIBA for AMS. Pt admits to etoh use today. Pt has no sign of trauma noted to head or body at this time. Pt breathing without difficulty. Pt arousable to voice. Safety maintained. WIll continue to monitor.

## 2019-12-08 NOTE — ED CDU PROVIDER INITIAL DAY NOTE - OBJECTIVE STATEMENT
55 yo M BIB EMS for ETOH intoxication, admits to ETOH today, no other complaints, unable to cooperate with remainder of history 2/2 mental status.  no trauma noted or reported
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
## 2019-12-08 NOTE — ED PROVIDER NOTE - PHYSICAL EXAMINATION
CON: somnolent, arousable to loud verbal or painful stimuli,   HENMT: no pooling of secretion, protecting airway, no facial ecchymosis or evidence of trauma, perrl  HEAD: no obvious hematoma or laceration,   CV: rrr,   RESP: chest rise, breathing spontaneously,   GI: soft,   SKIN: no laceration or abrasion noted,   MSK: no deformity noted,   NEURO: limited exam 2/2 mental status
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
## 2019-12-12 DIAGNOSIS — Z88.0 ALLERGY STATUS TO PENICILLIN: ICD-10-CM

## 2019-12-12 DIAGNOSIS — F10.129 ALCOHOL ABUSE WITH INTOXICATION, UNSPECIFIED: ICD-10-CM

## 2019-12-12 DIAGNOSIS — R41.82 ALTERED MENTAL STATUS, UNSPECIFIED: ICD-10-CM

## 2019-12-26 ENCOUNTER — EMERGENCY (EMERGENCY)
Facility: HOSPITAL | Age: 54
LOS: 1 days | Discharge: HOME | End: 2019-12-26
Attending: EMERGENCY MEDICINE | Admitting: EMERGENCY MEDICINE
Payer: MEDICAID

## 2019-12-26 VITALS
WEIGHT: 175.05 LBS | DIASTOLIC BLOOD PRESSURE: 75 MMHG | OXYGEN SATURATION: 100 % | SYSTOLIC BLOOD PRESSURE: 117 MMHG | TEMPERATURE: 98 F | RESPIRATION RATE: 17 BRPM | HEART RATE: 96 BPM

## 2019-12-26 DIAGNOSIS — Z88.0 ALLERGY STATUS TO PENICILLIN: ICD-10-CM

## 2019-12-26 DIAGNOSIS — R41.82 ALTERED MENTAL STATUS, UNSPECIFIED: ICD-10-CM

## 2019-12-26 DIAGNOSIS — Z98.89 OTHER SPECIFIED POSTPROCEDURAL STATES: Chronic | ICD-10-CM

## 2019-12-26 DIAGNOSIS — F10.129 ALCOHOL ABUSE WITH INTOXICATION, UNSPECIFIED: ICD-10-CM

## 2019-12-26 PROCEDURE — 99284 EMERGENCY DEPT VISIT MOD MDM: CPT
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
## 2019-12-26 NOTE — ED ADULT NURSE NOTE - NSIMPLEMENTINTERV_GEN_ALL_ED
Implemented All Fall with Harm Risk Interventions:  [place] to call system. Call bell, personal items and telephone within reach. Instruct patient to call for assistance. Room bathroom lighting operational. Non-slip footwear when patient is off stretcher. Physically safe environment: no spills, clutter or unnecessary equipment. Stretcher in lowest position, wheels locked, appropriate side rails in place. Provide visual cue, wrist band, yellow gown, etc. Monitor gait and stability. Monitor for mental status changes and reorient to person, place, and time. Review medications for side effects contributing to fall risk. Reinforce activity limits and safety measures with patient and family. Provide visual clues: red socks.
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
## 2019-12-26 NOTE — ED PROVIDER NOTE - PATIENT PORTAL LINK FT
You can access the FollowMyHealth Patient Portal offered by [facility] by registering at the following website: [email or web address]. By joining [facility]’s FollowMyHealth portal, you will also be able to view your health information using other applications (apps) compatible with our system.
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
## 2019-12-26 NOTE — ED PROVIDER NOTE - NSFOLLOWUPINSTRUCTIONS_ED_ALL_ED_FT
You must refrain from alcohol and illicit drug use.  Please call Patient Access Services to help with obtaining rehab services.
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
## 2019-12-26 NOTE — ED PROVIDER NOTE - NSFOLLOWUPCLINICS_GEN_ALL_ED_FT
Alcoholics Anonymous - 24 hour hotline  Alcoholics Anonymous  .  [place]   Phone: [phone]  Fax:   Follow Up Time:

## 2019-12-31 ENCOUNTER — EMERGENCY (EMERGENCY)
Facility: HOSPITAL | Age: 54
LOS: 1 days | Discharge: HOME | End: 2019-12-31
Attending: EMERGENCY MEDICINE | Admitting: EMERGENCY MEDICINE
Payer: MEDICAID

## 2019-12-31 VITALS
SYSTOLIC BLOOD PRESSURE: 142 MMHG | DIASTOLIC BLOOD PRESSURE: 90 MMHG | TEMPERATURE: 97 F | RESPIRATION RATE: 17 BRPM | OXYGEN SATURATION: 100 % | HEART RATE: 75 BPM

## 2019-12-31 DIAGNOSIS — F10.129 ALCOHOL ABUSE WITH INTOXICATION, UNSPECIFIED: ICD-10-CM

## 2019-12-31 DIAGNOSIS — Z88.0 ALLERGY STATUS TO PENICILLIN: ICD-10-CM

## 2019-12-31 DIAGNOSIS — R41.82 ALTERED MENTAL STATUS, UNSPECIFIED: ICD-10-CM

## 2019-12-31 DIAGNOSIS — Z98.89 OTHER SPECIFIED POSTPROCEDURAL STATES: Chronic | ICD-10-CM

## 2019-12-31 PROCEDURE — 99284 EMERGENCY DEPT VISIT MOD MDM: CPT

## 2019-12-31 NOTE — ED ADULT NURSE NOTE - NSIMPLEMENTINTERV_GEN_ALL_ED
Implemented All Fall Risk Interventions:  [place] to call system. Call bell, personal items and telephone within reach. Instruct patient to call for assistance. Room bathroom lighting operational. Non-slip footwear when patient is off stretcher. Physically safe environment: no spills, clutter or unnecessary equipment. Stretcher in lowest position, wheels locked, appropriate side rails in place. Provide visual cue, wrist band, yellow gown, etc. Monitor gait and stability. Monitor for mental status changes and reorient to person, place, and time. Review medications for side effects contributing to fall risk. Reinforce activity limits and safety measures with patient and family.

## 2019-12-31 NOTE — ED PROVIDER NOTE - CLINICAL SUMMARY MEDICAL DECISION MAKING FREE TEXT BOX
Patient eloped prior to reassessment.  Presented with AMS likely secondary to alcohol intoxication with hx of alcohol abuse.  Vitals reviewed.  Patient placed in hallway stretcher.

## 2019-12-31 NOTE — ED ADULT TRIAGE NOTE - CHIEF COMPLAINT QUOTE
patient BIBA from [facility] on [address]. patient here for alcohol intoxication admits to drinking prior to arrival to hospital.
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
## 2019-12-31 NOTE — ED ADULT NURSE NOTE - CHIEF COMPLAINT QUOTE
patient BIBA from [facility] on [address]. patient here for alcohol intoxication admits to drinking prior to arrival to hospital.
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
## 2019-12-31 NOTE — ED PROVIDER NOTE - PHYSICAL EXAMINATION
Gen - AOB, unkempt, AVPU - verbal  Skin - warm, dry, intact   HEENT - airway patent      MS - No deformities  Neuro - Slurred speech, No facial asymmetry.  Can follow basic commands

## 2020-01-07 ENCOUNTER — HOSPITAL ENCOUNTER (INPATIENT)
Dept: HOSPITAL 74 - YASAS | Age: 55
LOS: 4 days | Discharge: OTHER INSTITUTION | End: 2020-01-11
Attending: ALLERGY & IMMUNOLOGY | Admitting: ALLERGY & IMMUNOLOGY
Payer: COMMERCIAL

## 2020-01-07 VITALS — BODY MASS INDEX: 23.5 KG/M2

## 2020-01-07 DIAGNOSIS — F12.20: ICD-10-CM

## 2020-01-07 DIAGNOSIS — R26.89: ICD-10-CM

## 2020-01-07 DIAGNOSIS — M12.9: ICD-10-CM

## 2020-01-07 DIAGNOSIS — F25.9: ICD-10-CM

## 2020-01-07 DIAGNOSIS — Z88.0: ICD-10-CM

## 2020-01-07 DIAGNOSIS — R45.89: ICD-10-CM

## 2020-01-07 DIAGNOSIS — Z99.89: ICD-10-CM

## 2020-01-07 DIAGNOSIS — K29.50: ICD-10-CM

## 2020-01-07 DIAGNOSIS — F10.230: Primary | ICD-10-CM

## 2020-01-07 DIAGNOSIS — F31.9: ICD-10-CM

## 2020-01-07 DIAGNOSIS — Z86.19: ICD-10-CM

## 2020-01-07 DIAGNOSIS — Z96.653: ICD-10-CM

## 2020-01-07 DIAGNOSIS — F41.9: ICD-10-CM

## 2020-01-07 DIAGNOSIS — K21.9: ICD-10-CM

## 2020-01-07 DIAGNOSIS — F17.210: ICD-10-CM

## 2020-01-07 DIAGNOSIS — Z91.81: ICD-10-CM

## 2020-01-07 PROCEDURE — HZ2ZZZZ DETOXIFICATION SERVICES FOR SUBSTANCE ABUSE TREATMENT: ICD-10-PCS | Performed by: ALLERGY & IMMUNOLOGY

## 2020-01-07 RX ADMIN — FAMOTIDINE SCH MG: 20 TABLET ORAL at 21:15

## 2020-01-07 RX ADMIN — Medication SCH MG: at 21:15
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
## 2020-01-08 LAB
ALBUMIN SERPL-MCNC: 3.6 G/DL (ref 3.4–5)
ALP SERPL-CCNC: 53 U/L (ref 45–117)
ALT SERPL-CCNC: 47 U/L (ref 13–61)
ANION GAP SERPL CALC-SCNC: 8 MMOL/L (ref 8–16)
AST SERPL-CCNC: 44 U/L (ref 15–37)
BILIRUB SERPL-MCNC: 1.7 MG/DL (ref 0.2–1)
BUN SERPL-MCNC: 8.8 MG/DL (ref 7–18)
CALCIUM SERPL-MCNC: 8.6 MG/DL (ref 8.5–10.1)
CHLORIDE SERPL-SCNC: 102 MMOL/L (ref 98–107)
CO2 SERPL-SCNC: 28 MMOL/L (ref 21–32)
CREAT SERPL-MCNC: 0.7 MG/DL (ref 0.55–1.3)
DEPRECATED RDW RBC AUTO: 14.4 % (ref 11.9–15.9)
GLUCOSE SERPL-MCNC: 80 MG/DL (ref 74–106)
HCT VFR BLD CALC: 38.8 % (ref 35.4–49)
HGB BLD-MCNC: 13.1 GM/DL (ref 11.7–16.9)
MCH RBC QN AUTO: 33.2 PG (ref 25.7–33.7)
MCHC RBC AUTO-ENTMCNC: 33.8 G/DL (ref 32–35.9)
MCV RBC: 98.3 FL (ref 80–96)
PLATELET # BLD AUTO: 174 K/MM3 (ref 134–434)
PMV BLD: 8.7 FL (ref 7.5–11.1)
POTASSIUM SERPLBLD-SCNC: 3.8 MMOL/L (ref 3.5–5.1)
PROT SERPL-MCNC: 6.8 G/DL (ref 6.4–8.2)
RBC # BLD AUTO: 3.95 M/MM3 (ref 4–5.6)
SODIUM SERPL-SCNC: 138 MMOL/L (ref 136–145)
WBC # BLD AUTO: 4 K/MM3 (ref 4–10)

## 2020-01-08 RX ADMIN — DIVALPROEX SODIUM SCH MG: 500 TABLET, DELAYED RELEASE ORAL at 22:34

## 2020-01-08 RX ADMIN — FAMOTIDINE SCH MG: 20 TABLET ORAL at 09:34

## 2020-01-08 RX ADMIN — Medication SCH MG: at 22:34

## 2020-01-08 RX ADMIN — Medication SCH TAB: at 09:34

## 2020-01-08 RX ADMIN — TRAZODONE HYDROCHLORIDE SCH MG: 100 TABLET ORAL at 22:34

## 2020-01-08 RX ADMIN — FAMOTIDINE SCH MG: 20 TABLET ORAL at 22:34

## 2020-01-08 NOTE — EKG
Test Reason : 

Blood Pressure : ***/*** mmHG

Vent. Rate : 090 BPM     Atrial Rate : 090 BPM

   P-R Int : 144 ms          QRS Dur : 068 ms

    QT Int : 368 ms       P-R-T Axes : 000 052 054 degrees

   QTc Int : 450 ms

 

NORMAL SINUS RHYTHM

NORMAL ECG

WHEN COMPARED WITH ECG OF 12-JAN-2018 17:51,

NONSPECIFIC T WAVE ABNORMALITY NOW EVIDENT IN INFERIOR LEADS

Confirmed by KRISTIAN JACKSON, GRUPO ([id number]) on 1/8/2020 11:13:35 AM

 

Referred By:             Confirmed By:GRUPO TOWNSEND MD

## 2020-01-08 NOTE — PN
S CIWA





- CIWA Score


Nausea/Vomitin-Mild Nausea/No Vomiting


Muscle Tremors: 4-Moderate,w/Arms Extend


Anxiety: 3


Agitation: 1-Slight > Activity


Paroxysmal Sweats: 2


Orientation: 0-Oriented


Tacttile Disturbances: 1-Very Mild Itch/Numbness


Auditory Disturbances: 0-None


Visual Disturbances: 0-None


Headache: 0-None Present


CIWA-Ar Total Score: 12





[facility] Progress Note (SOAP)


Subjective: 





54 years old male admitted on 20 for alcohol withdrawal sx management 

treating with librium detox regimen ate breakfast resting in bed tremor of both 

hands ambulating with crutches due to left knee deformity limited mobility





Objective: 





20 09:38


 Vital Signs











Temperature  98.6 F   20 09:09


 


Pulse Rate  85   20 09:09


 


Respiratory Rate  18   20 09:09


 


Blood Pressure  114/61   20 09:09


 


O2 Sat by Pulse Oximetry (%)      











20 09:38


lab noted


Assessment: 





20 09:38


alcohol withdrawal 


Plan: 





librium regimen
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
## 2020-01-09 RX ADMIN — DIVALPROEX SODIUM SCH MG: 500 TABLET, DELAYED RELEASE ORAL at 11:47

## 2020-01-09 RX ADMIN — SERTRALINE HYDROCHLORIDE SCH MG: 50 TABLET ORAL at 11:48

## 2020-01-09 RX ADMIN — FAMOTIDINE SCH MG: 20 TABLET ORAL at 22:43

## 2020-01-09 RX ADMIN — Medication SCH MG: at 22:43

## 2020-01-09 RX ADMIN — DIVALPROEX SODIUM SCH MG: 500 TABLET, DELAYED RELEASE ORAL at 22:43

## 2020-01-09 RX ADMIN — TRAZODONE HYDROCHLORIDE SCH MG: 100 TABLET ORAL at 22:43

## 2020-01-09 RX ADMIN — Medication SCH TAB: at 11:48

## 2020-01-09 RX ADMIN — FAMOTIDINE SCH MG: 20 TABLET ORAL at 11:47

## 2020-01-09 NOTE — PN
BHS Progress Note


Note: 





Patient vomited x 2


 Vital Signs











Temperature  98.3 F   01/09/20 17:50


 


Pulse Rate  108 H  01/09/20 17:50


 


Respiratory Rate  18   01/09/20 17:50


 


Blood Pressure  129/89   01/09/20 17:50


 


O2 Sat by Pulse Oximetry (%)      








Action: Tigan 200mg intramuscular ordered

## 2020-01-09 NOTE — PN
S CIWA





- CIWA Score


Nausea/Vomitin-No Nausea/No Vomiting


Muscle Tremors: None


Anxiety: 3


Agitation: 2


Paroxysmal Sweats: 1-Minimal Palms Moist


Orientation: 0-Oriented


Tacttile Disturbances: 0-None


Auditory Disturbances: 0-None


Visual Disturbances: 1-Very Mild Sensitivity


Headache: 2-Mild


CIWA-Ar Total Score: 9





BHS Progress Note (SOAP)


Subjective: 





54 years old male admitted on 20 for alcohol withdrawal sx management 

treating wtih librium detox regimen feeling ok today social with peers in day 

room  discuss aftercare with staff





Objective: 





20 10:15


 Vital Signs











Temperature  97.7 F   20 09:25


 


Pulse Rate  104 H  20 09:25


 


Respiratory Rate  18   20 09:25


 


Blood Pressure  132/85   20 09:25


 


O2 Sat by Pulse Oximetry (%)      











Assessment: 





20 10:23


alcohol withdrawal 


Plan: 





librium regimen

## 2020-01-10 RX ADMIN — FAMOTIDINE SCH MG: 20 TABLET ORAL at 10:31

## 2020-01-10 RX ADMIN — SERTRALINE HYDROCHLORIDE SCH MG: 50 TABLET ORAL at 10:31

## 2020-01-10 RX ADMIN — ACETAMINOPHEN PRN MG: 325 TABLET ORAL at 14:12

## 2020-01-10 RX ADMIN — DIVALPROEX SODIUM SCH MG: 500 TABLET, DELAYED RELEASE ORAL at 10:31

## 2020-01-10 RX ADMIN — DIVALPROEX SODIUM SCH MG: 500 TABLET, DELAYED RELEASE ORAL at 22:28

## 2020-01-10 RX ADMIN — Medication SCH TAB: at 10:31

## 2020-01-10 RX ADMIN — Medication SCH MG: at 22:28

## 2020-01-10 RX ADMIN — TRAZODONE HYDROCHLORIDE SCH MG: 100 TABLET ORAL at 22:28

## 2020-01-10 RX ADMIN — FAMOTIDINE SCH MG: 20 TABLET ORAL at 22:28

## 2020-01-10 NOTE — PN
BHS CIWA





- CIWA Score


Nausea/Vomiting: 3


Muscle Tremors: None


Anxiety: 1-Mildly Anxious


Agitation: 1-Slight > Activity


Paroxysmal Sweats: No Perspiration


Orientation: 0-Oriented


Tacttile Disturbances: 0-None


Auditory Disturbances: 0-None


Visual Disturbances: 0-None


Headache: 1-Very Mild


CIWA-Ar Total Score: 6





BHS Progress Note (SOAP)


Subjective: 





pt states still feeling nauseous- rec'd tigan last night. anticipate discharge 

to rehab tomorrow- here preferable


54 years old male admitted on 01/07/20 for alcohol withdrawal sx management 

treating wtih librium detox regimen








O:


 Vital Signs - 24 hr











  01/09/20 01/09/20 01/09/20





  13:09 17:50 22:37


 


Temperature 98.1 F 98.3 F 98.3 F


 


Pulse Rate 92 H 108 H 71


 


Respiratory 18 18 110 H





Rate   


 


Blood Pressure 147/91 129/89 129/89














  01/10/20 01/10/20 01/10/20





  00:27 03:30 05:51


 


Temperature   98.2 F


 


Pulse Rate   81


 


Respiratory 18 18 16





Rate   


 


Blood Pressure   99/58 L














  01/10/20





  09:13


 


Temperature 97.0 F L


 


Pulse Rate 101 H


 


Respiratory 18





Rate 


 


Blood Pressure 130/90








 Laboratory Tests











  01/08/20 01/08/20 01/08/20





  08:00 08:00 08:00


 


WBC  4.0  


 


RBC  3.95 L  


 


Hgb  13.1  


 


Hct  38.8  


 


MCV  98.3 H  


 


MCH  33.2  


 


MCHC  33.8  


 


RDW  14.4  


 


Plt Count  174  


 


MPV  8.7  D  


 


Sodium   138 


 


Potassium   3.8 


 


Chloride   102 


 


Carbon Dioxide   28 


 


Anion Gap   8 


 


BUN   8.8 


 


Creatinine   0.7 


 


Est GFR (CKD-EPI)AfAm   125.75 


 


Est GFR (CKD-EPI)NonAf   108.50 


 


Random Glucose   80 


 


Calcium   8.6 


 


Total Bilirubin   1.7 H 


 


AST   44 H 


 


ALT   47 


 


Alkaline Phosphatase   53 


 


Total Protein   6.8 


 


Albumin   3.6 


 


Valproic Acid   


 


RPR Titer    Nonreactive














  01/08/20





  13:00


 


WBC 


 


RBC 


 


Hgb 


 


Hct 


 


MCV 


 


MCH 


 


MCHC 


 


RDW 


 


Plt Count 


 


MPV 


 


Sodium 


 


Potassium 


 


Chloride 


 


Carbon Dioxide 


 


Anion Gap 


 


BUN 


 


Creatinine 


 


Est GFR (CKD-EPI)AfAm 


 


Est GFR (CKD-EPI)NonAf 


 


Random Glucose 


 


Calcium 


 


Total Bilirubin 


 


AST 


 


ALT 


 


Alkaline Phosphatase 


 


Total Protein 


 


Albumin 


 


Valproic Acid  27.3 L


 


RPR Titer 








mildly increased liver enzymes





a/p: continue alcohol detox protocol


anticipate d/c tomorrow to rehab

## 2020-01-11 ENCOUNTER — HOSPITAL ENCOUNTER (INPATIENT)
Dept: HOSPITAL 74 - YASAS | Age: 55
LOS: 10 days | Discharge: HOME | DRG: 772 | End: 2020-01-21
Attending: NEUROMUSCULOSKELETAL MEDICINE & OMM | Admitting: NEUROMUSCULOSKELETAL MEDICINE & OMM
Payer: COMMERCIAL

## 2020-01-11 VITALS — SYSTOLIC BLOOD PRESSURE: 102 MMHG | HEART RATE: 87 BPM | TEMPERATURE: 98.7 F | DIASTOLIC BLOOD PRESSURE: 60 MMHG

## 2020-01-11 DIAGNOSIS — K21.9: ICD-10-CM

## 2020-01-11 DIAGNOSIS — F31.9: ICD-10-CM

## 2020-01-11 DIAGNOSIS — Z94.5: ICD-10-CM

## 2020-01-11 DIAGNOSIS — F12.20: ICD-10-CM

## 2020-01-11 DIAGNOSIS — F19.24: ICD-10-CM

## 2020-01-11 DIAGNOSIS — G47.00: ICD-10-CM

## 2020-01-11 DIAGNOSIS — K59.00: ICD-10-CM

## 2020-01-11 DIAGNOSIS — Z99.89: ICD-10-CM

## 2020-01-11 DIAGNOSIS — Z91.81: ICD-10-CM

## 2020-01-11 DIAGNOSIS — Z96.653: ICD-10-CM

## 2020-01-11 DIAGNOSIS — F17.210: ICD-10-CM

## 2020-01-11 DIAGNOSIS — F10.20: Primary | ICD-10-CM

## 2020-01-11 DIAGNOSIS — K29.70: ICD-10-CM

## 2020-01-11 DIAGNOSIS — F25.9: ICD-10-CM

## 2020-01-11 DIAGNOSIS — F19.282: ICD-10-CM

## 2020-01-11 DIAGNOSIS — M12.9: ICD-10-CM

## 2020-01-11 DIAGNOSIS — F14.20: ICD-10-CM

## 2020-01-11 PROCEDURE — HZ42ZZZ GROUP COUNSELING FOR SUBSTANCE ABUSE TREATMENT, COGNITIVE-BEHAVIORAL: ICD-10-PCS | Performed by: NEUROMUSCULOSKELETAL MEDICINE & OMM

## 2020-01-11 RX ADMIN — DIVALPROEX SODIUM SCH MG: 500 TABLET, DELAYED RELEASE ORAL at 10:43

## 2020-01-11 RX ADMIN — FAMOTIDINE SCH MG: 20 TABLET ORAL at 10:43

## 2020-01-11 RX ADMIN — FAMOTIDINE SCH MG: 20 TABLET ORAL at 21:52

## 2020-01-11 RX ADMIN — SERTRALINE HYDROCHLORIDE SCH MG: 50 TABLET ORAL at 10:42

## 2020-01-11 RX ADMIN — Medication SCH TAB: at 10:42

## 2020-01-11 RX ADMIN — ACETAMINOPHEN PRN MG: 325 TABLET ORAL at 10:45

## 2020-01-11 RX ADMIN — Medication PRN MG: at 21:54

## 2020-01-11 RX ADMIN — Medication SCH MG: at 21:52

## 2020-01-11 NOTE — HP
BHS MD Rehab Assess/Revision





- Admission History


Ad[facility] Rehab from[facility]


Date of Admission to Rehab: 1/11/2020





- Vital signs


Vital Signs: 





Stable.





- Findings


Detox History & Physical reviewed: Yes


Concur with findings: Yes





Inpatient Rehab Admission





- Rehab Decision to Admit


Inpatient rehab admission?: Yes





- Initial Determination


Are CD services needed?: Yes


Free of communicable disease: Yes


Not in need of hospitalization: Yes





- Rehab Admission Criteria


Previous failed treatment: Yes


Poor recovery environment: Yes


Comorbidities: Yes


Lacks judgement: Yes


Patient is meeting Inpatient Rehab admission criteria:: Yes

## 2020-01-11 NOTE — DS
BHS Detox Discharge Summary


Admission Date: 


01/07/20





Discharge Date: 01/11/20





- History


Present History: Alcohol Dependence, Cannabis Dependence


Additional Comments: 





Pt is medically cleared and discharge to [facility] for 

continued management. Pt completed the detox protocol. Pt is encouraged to 

follow through with the rehab protocol. Pt verbalized understanding. Pt is 

alert and oriented x3 and in no respiratory distress.


Pertinent Past History: 





h/o GERD, alcohol, and cannabis use disorder.





- Physical Exam Results


Vital Signs: 


 Vital Signs











Temperature  98.7 F   01/11/20 09:28


 


Pulse Rate  87   01/11/20 09:28


 


Respiratory Rate  18   01/11/20 09:28


 


Blood Pressure  102/60   01/11/20 09:28


 


O2 Sat by Pulse Oximetry (%)      








 Vital Signs











  01/11/20 01/11/20





  06:39 09:28


 


Temperature 97.1 F L 98.7 F


 


Pulse Rate 85 87


 


Respiratory 18 18





Rate  


 


Blood Pressure 124/74 102/60








 Laboratory Last Values











WBC  4.0 K/mm3 (4.0-10.0)   01/08/20  08:00    


 


RBC  3.95 M/mm3 (4.00-5.60)  L  01/08/20  08:00    


 


Hgb  13.1 GM/dL (11.7-16.9)   01/08/20  08:00    


 


Hct  38.8 % (35.4-49)   01/08/20  08:00    


 


MCV  98.3 fl (80-96)  H  01/08/20  08:00    


 


MCH  33.2 pg (25.7-33.7)   01/08/20  08:00    


 


MCHC  33.8 g/dl (32.0-35.9)   01/08/20  08:00    


 


RDW  14.4 % (11.9-15.9)   01/08/20  08:00    


 


Plt Count  174 K/MM3 (134-434)   01/08/20  08:00    


 


MPV  8.7 fl (7.5-11.1)  D 01/08/20  08:00    


 


Sodium  138 mmol/L (136-145)   01/08/20  08:00    


 


Potassium  3.8 mmol/L (3.5-5.1)   01/08/20  08:00    


 


Chloride  102 mmol/L ()   01/08/20  08:00    


 


Carbon Dioxide  28 mmol/L (21-32)   01/08/20  08:00    


 


Anion Gap  8 MMOL/L (8-16)   01/08/20  08:00    


 


BUN  8.8 mg/dL (7-18)   01/08/20  08:00    


 


Creatinine  0.7 mg/dL (0.55-1.3)   01/08/20  08:00    


 


Est GFR (CKD-EPI)AfAm  125.75   01/08/20  08:00    


 


Est GFR (CKD-EPI)NonAf  108.50   01/08/20  08:00    


 


Random Glucose  80 mg/dL ()   01/08/20  08:00    


 


Calcium  8.6 mg/dL (8.5-10.1)   01/08/20  08:00    


 


Total Bilirubin  1.7 mg/dL (0.2-1)  H  01/08/20  08:00    


 


AST  44 U/L (15-37)  H  01/08/20  08:00    


 


ALT  47 U/L (13-61)   01/08/20  08:00    


 


Alkaline Phosphatase  53 U/L ()   01/08/20  08:00    


 


Total Protein  6.8 g/dl (6.4-8.2)   01/08/20  08:00    


 


Albumin  3.6 g/dl (3.4-5.0)   01/08/20  08:00    


 


Valproic Acid  27.3 ug/mL ()  L  01/08/20  13:00    


 


RPR Titer  Nonreactive  (NONREACTIVE)   01/08/20  08:00    








Labs noted.


Pertinent Admission Physical Exam Findings: 





withdrawal symptoms.





- Treatment


Hospital Course: Detox Protocol Followed, Detoxed Safely, Responded well, 

Discharged Condition Good, Rehab Referral Accepted


Patient has Accepted a Rehab Referral to: [facility]





- Medication


Discharge Medications: 


Ambulatory Orders





Docusate Sodium [Colace -] 100 mg PO TID #90 capsule 03/28/14 


Divalproex [Depakote -] 500 mg PO BID #60 tablet.ec 01/29/18 


Sertraline HCl [Zoloft -] 200 mg PO DAILY #30 tablet 01/29/18 


Quetiapine Fumarate [Seroquel -] 100 mg PO DAILY 01/07/20 


Quetiapine Fumarate [Seroquel -] 200 mg PO HS 01/07/20 


Ranitidine HCl [Zantac 75] 50 mg PO DAILY 01/07/20 


traZODone HCL [Desyrel -] 200 mg PO HS 01/07/20 











- Diagnosis


(1) Alcohol dependence with uncomplicated withdrawal


Status: Acute   





(2) Risk for falls


Status: Chronic   





(3) Cannabis dependence


Status: Chronic   





(4) Crutches as ambulation aid


Status: Chronic   





(5) GERD (gastroesophageal reflux disease)


Status: Chronic   


Qualifiers: 


   Esophagitis presence: esophagitis presence not specified   Qualified Code(s)

: K21.9 - Gastro-esophageal reflux disease without esophagitis   





(6) Gastritis


Status: Chronic   





(7) History of total bilateral knee replacement


Status: Chronic   





(8) ARTHRITS


Status: Chronic   





(9) Alcohol dependence


Status: Chronic   





- AMA


Did Patient Leave Against Medical Advice: No

## 2020-01-12 RX ADMIN — TRAZODONE HYDROCHLORIDE SCH MG: 50 TABLET ORAL at 21:39

## 2020-01-12 RX ADMIN — Medication PRN MG: at 22:17

## 2020-01-12 RX ADMIN — PANTOPRAZOLE SODIUM SCH MG: 40 TABLET, DELAYED RELEASE ORAL at 12:55

## 2020-01-12 RX ADMIN — Medication SCH TAB: at 10:14

## 2020-01-12 RX ADMIN — DIVALPROEX SODIUM SCH MG: 500 TABLET, DELAYED RELEASE ORAL at 11:40

## 2020-01-12 RX ADMIN — Medication SCH MG: at 21:39

## 2020-01-12 RX ADMIN — FAMOTIDINE SCH MG: 20 TABLET ORAL at 10:14

## 2020-01-12 RX ADMIN — FAMOTIDINE SCH MG: 20 TABLET ORAL at 21:39

## 2020-01-12 RX ADMIN — SERTRALINE HYDROCHLORIDE SCH MG: 50 TABLET ORAL at 11:40

## 2020-01-12 RX ADMIN — DIVALPROEX SODIUM SCH MG: 500 TABLET, DELAYED RELEASE ORAL at 22:17
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
## 2020-01-13 RX ADMIN — Medication SCH TAB: at 11:15

## 2020-01-13 RX ADMIN — FAMOTIDINE SCH MG: 20 TABLET ORAL at 11:15

## 2020-01-13 RX ADMIN — FAMOTIDINE SCH MG: 20 TABLET ORAL at 21:33

## 2020-01-13 RX ADMIN — Medication SCH MG: at 21:33

## 2020-01-13 RX ADMIN — TRAZODONE HYDROCHLORIDE SCH MG: 50 TABLET ORAL at 21:33

## 2020-01-13 RX ADMIN — SERTRALINE HYDROCHLORIDE SCH MG: 50 TABLET ORAL at 11:15

## 2020-01-13 RX ADMIN — DIVALPROEX SODIUM SCH MG: 500 TABLET, DELAYED RELEASE ORAL at 11:16

## 2020-01-13 RX ADMIN — PANTOPRAZOLE SODIUM SCH MG: 40 TABLET, DELAYED RELEASE ORAL at 11:15

## 2020-01-13 RX ADMIN — Medication PRN MG: at 21:34

## 2020-01-13 RX ADMIN — DIVALPROEX SODIUM SCH MG: 500 TABLET, DELAYED RELEASE ORAL at 21:33

## 2020-01-14 RX ADMIN — PANTOPRAZOLE SODIUM SCH MG: 40 TABLET, DELAYED RELEASE ORAL at 10:36

## 2020-01-14 RX ADMIN — DOCUSATE SODIUM SCH MG: 100 CAPSULE, LIQUID FILLED ORAL at 14:19

## 2020-01-14 RX ADMIN — FAMOTIDINE SCH MG: 20 TABLET ORAL at 10:37

## 2020-01-14 RX ADMIN — TRAZODONE HYDROCHLORIDE SCH MG: 50 TABLET ORAL at 21:38

## 2020-01-14 RX ADMIN — DOCUSATE SODIUM SCH MG: 100 CAPSULE, LIQUID FILLED ORAL at 21:37

## 2020-01-14 RX ADMIN — SERTRALINE HYDROCHLORIDE SCH MG: 50 TABLET ORAL at 10:37

## 2020-01-14 RX ADMIN — DIVALPROEX SODIUM SCH MG: 500 TABLET, DELAYED RELEASE ORAL at 10:37

## 2020-01-14 RX ADMIN — Medication PRN MG: at 21:38

## 2020-01-14 RX ADMIN — Medication SCH MG: at 21:39

## 2020-01-14 RX ADMIN — Medication SCH TAB: at 10:36

## 2020-01-14 RX ADMIN — HYDROXYZINE PAMOATE PRN MG: 25 CAPSULE ORAL at 21:40

## 2020-01-14 RX ADMIN — DIVALPROEX SODIUM SCH MG: 500 TABLET, DELAYED RELEASE ORAL at 21:38
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
## 2020-01-15 RX ADMIN — DOCUSATE SODIUM SCH MG: 100 CAPSULE, LIQUID FILLED ORAL at 06:43

## 2020-01-15 RX ADMIN — Medication PRN MG: at 21:37

## 2020-01-15 RX ADMIN — DIVALPROEX SODIUM SCH MG: 500 TABLET, DELAYED RELEASE ORAL at 11:47

## 2020-01-15 RX ADMIN — Medication SCH MG: at 21:37

## 2020-01-15 RX ADMIN — DOCUSATE SODIUM SCH MG: 100 CAPSULE, LIQUID FILLED ORAL at 14:57

## 2020-01-15 RX ADMIN — SERTRALINE HYDROCHLORIDE SCH MG: 50 TABLET ORAL at 11:47

## 2020-01-15 RX ADMIN — DOCUSATE SODIUM SCH MG: 100 CAPSULE, LIQUID FILLED ORAL at 21:37

## 2020-01-15 RX ADMIN — Medication SCH TAB: at 11:47

## 2020-01-15 RX ADMIN — TRAZODONE HYDROCHLORIDE SCH MG: 50 TABLET ORAL at 21:37

## 2020-01-15 RX ADMIN — PANTOPRAZOLE SODIUM SCH MG: 40 TABLET, DELAYED RELEASE ORAL at 06:43

## 2020-01-15 RX ADMIN — DIVALPROEX SODIUM SCH MG: 500 TABLET, DELAYED RELEASE ORAL at 21:37

## 2020-01-15 NOTE — PN
Psychiatric Progress Note


Vital Signs: 


 Vital Signs











 Period  Temp  Pulse  Resp  BP Sys/Toscano  Pulse Ox


 


 Last 24 Hr  97.1 F  91  18-18  113/69  











Date of Session: 01/15/20


Chief Complaint:: " I cannot sleep at night. I am anxious. I need more seroquel.

"
HPI: Called to adjust seroquel dose for this patient who continues to present 

with complaints of mood dysregulation and refractory insomnia. Mr Gordon is 

well known to [facility]. Hospital course remains fair except for dysphoria/

insomnia.


ROS: Patient ambulates with crutches. Alert and cognitively intact.


Current Medications: 


Active Medications











Generic Name Dose Route Start Last Admin





  Trade Name Freq  PRN Reason Stop Dose Admin


 


Acetaminophen  650 mg  01/11/20 14:09  01/12/20 06:44





  Tylenol -  PO   650 mg





  Q6H PRN   Administration





  FEVER   





     





     





     


 


Al Hydroxide/Mg Hydroxide  30 ml  01/11/20 14:09  





  Mylanta Oral Suspension -  PO   





  Q6H PRN   





  DYSPEPSIA   





     





     





     


 


Divalproex Sodium  500 mg  01/12/20 11:15  01/15/20 11:47





  Depakote -  PO   500 mg





  BID MARYCARMEN   Administration





     





     





     





     


 


Docusate Sodium  100 mg  01/14/20 14:00  01/15/20 14:57





  Colace -  PO   100 mg





  TID MARYCARMEN   Administration





     





     





     





     


 


Eucalyptus/Menthol/Phenol/Sorbitol  1 each  01/11/20 14:09  





  Cepastat Lozenge -  MM   





  Q4H PRN   





  SORE THROAT   





     





     





     


 


Guaifenesin  10 ml  01/11/20 14:09  





  Robitussin -  PO   





  Q6H PRN   





  COUGH   





     





     





     


 


Hydroxyzine Pamoate  25 mg  01/11/20 14:09  01/14/20 21:40





  Vistaril -  PO   25 mg





  Q6H PRN   Administration





  AGITATION   





     





     





     


 


Ibuprofen  400 mg  01/11/20 14:09  01/14/20 06:56





  Motrin -  PO   400 mg





  Q6H PRN   Administration





  Pain Level 4-6   





     





     





     


 


Loperamide HCl  4 mg  01/11/20 14:09  





  Imodium -  PO   





  Q6H PRN   





  DIARRHEA   





     





     





     


 


Magnesium Hydroxide  30 ml  01/11/20 14:09  





  Milk Of Magnesia -  PO   





  DAILY PRN   





  CONSTIPATION   





     





     





     


 


Melatonin  5 mg  01/11/20 22:00  01/14/20 21:38





  Melatonin  PO   5 mg





  HS PRN   Administration





  INSOMNIA   





     





     





     


 


Nicotine Polacrilex  2 mg  01/11/20 14:13  





  Nicorette Gum -  BUC   





  Q2H PRN   





  NICOTINE REPLACEMENT RX   





     





     





     


 


Pantoprazole Sodium  40 mg  01/15/20 06:00  01/15/20 06:43





  Protonix -  PO   40 mg





  DAILY@0600 MARYCARMEN   Administration





     





     





     





     


 


Prenatal Multivit/Folic Acid/Iron  1 tab  01/12/20 10:00  01/15/20 11:47





  Prenatal Vitamins (Sjr) -  PO   1 tab





  DAILY MARYCARMEN   Administration





     





     





     





     


 


Pseudoephedrine/Triprolidine  1 combo  01/11/20 14:09  





  Actifed -  PO   





  TID PRN   





  NASAL CONGESTION   





     





     





     


 


Quetiapine Fumarate  200 mg  01/13/20 20:31  01/14/20 21:38





  Seroquel -  PO   200 mg





  HS MARYCARMEN   Administration





     





     





     





     


 


Sertraline HCl  100 mg  01/12/20 11:30  01/15/20 11:47





  Zoloft -  PO   100 mg





  DAILY MARYCARMEN   Administration





     





     





     





     


 


Thiamine HCl  100 mg  01/11/20 22:00  01/14/20 21:39





  Vitamin B1 -  PO   100 mg





  HS MARYCARMEN   Administration





     





     





     





     


 


Trazodone HCl  150 mg  01/12/20 22:00  01/14/20 21:38





  Desyrel -  PO   150 mg





  HS MARYCARMEN   Administration





     





     





     





     











Medication(s) Change(s): Yes. Medications revisited. Records reviewed. Mr Gordon is a reliable historian. Review of records ([facility]) confirms daily dose 

of quetiapine above 200 mg. Good tolerability has been well established. 

Intervention : seroquel is now raised to 300 mg po hs. Patient is reminded of 

potential for metabolic syndrome and sedation. He expresses agreement to this 

plan of care. Gave his informed consent (verbal) to MD. Psychiatry-Liaison will 

follow.


Current Side Effect: No


Lab tests ordered: No


Lab tests reviewed: Yes


Provider note:: Chart reviewed. Met with the patient. Complaints of insomnia + 

dysphoric mood are validated. Patient is reassured by changes made to his 

medications doses. Looking much better in spite of his complaints. Noted as 

well groomed, friendlier and more conversant. Stable mental status. See MSE 

report for details. Fair progress.


Total face to face time:: 35





Mental Status Exam





- Mental Status Exam


Alert and Oriented to: Time, Place, Person


Cognitive Function: Good


Patient Appearance: Well Groomed


Mood: Withdrawn, Anxious


Affect: Appropriate, Constricted (mildly constricted)


Patient Behavior: Fatigued, Appropriate, Cooperative


Speech Pattern: Clear, Appropriate


Voice Loudness: Normal


Thought Process: Intact, Goal Oriented


Thought Disorder: Not Present


Hallucinations: Denies


Suicidal Ideation: Denies


Homicidal Ideation: Denies


Insight/Judgement: Fair


Sleep: Poorly, Difficulty falling asleep


Appetite: Good


Gait/Station: Other (walks with crutches)





Psychiatric Treatment Plan





- Problem List


(1) Insomnia


Current Visit: Yes   Comment: .   





(2) Alcohol dependence


Current Visit: Yes   Comment: .   





(3) Nicotine dependence


Current Visit: Yes   


Qualifiers: 


   Nicotine product type: cigarettes   Substance use status: uncomplicated   

Qualified Code(s): F17.210 - Nicotine dependence, cigarettes, uncomplicated   


Comment: .   





(4) Substance induced mood disorder


Current Visit: Yes   Comment: .   





(5) Schizoaffective disorder


Current Visit: Yes   Comment: .

## 2020-01-16 RX ADMIN — DOCUSATE SODIUM SCH MG: 100 CAPSULE, LIQUID FILLED ORAL at 14:28

## 2020-01-16 RX ADMIN — DOCUSATE SODIUM SCH MG: 100 CAPSULE, LIQUID FILLED ORAL at 21:35

## 2020-01-16 RX ADMIN — Medication SCH TAB: at 10:11

## 2020-01-16 RX ADMIN — TRAZODONE HYDROCHLORIDE SCH MG: 50 TABLET ORAL at 21:35

## 2020-01-16 RX ADMIN — Medication PRN MG: at 21:36

## 2020-01-16 RX ADMIN — HYDROXYZINE PAMOATE PRN MG: 25 CAPSULE ORAL at 21:35

## 2020-01-16 RX ADMIN — DOCUSATE SODIUM SCH MG: 100 CAPSULE, LIQUID FILLED ORAL at 07:00

## 2020-01-16 RX ADMIN — Medication SCH MG: at 21:36

## 2020-01-16 RX ADMIN — DIVALPROEX SODIUM SCH MG: 500 TABLET, DELAYED RELEASE ORAL at 21:35

## 2020-01-16 RX ADMIN — DIVALPROEX SODIUM SCH MG: 500 TABLET, DELAYED RELEASE ORAL at 10:11

## 2020-01-16 RX ADMIN — SERTRALINE HYDROCHLORIDE SCH MG: 50 TABLET ORAL at 10:11

## 2020-01-16 RX ADMIN — PANTOPRAZOLE SODIUM SCH MG: 40 TABLET, DELAYED RELEASE ORAL at 07:00

## 2020-01-17 RX ADMIN — DOCUSATE SODIUM SCH MG: 100 CAPSULE, LIQUID FILLED ORAL at 07:00

## 2020-01-17 RX ADMIN — DOCUSATE SODIUM SCH MG: 100 CAPSULE, LIQUID FILLED ORAL at 21:11

## 2020-01-17 RX ADMIN — DIVALPROEX SODIUM SCH MG: 500 TABLET, DELAYED RELEASE ORAL at 21:11

## 2020-01-17 RX ADMIN — DIVALPROEX SODIUM SCH MG: 500 TABLET, DELAYED RELEASE ORAL at 12:06

## 2020-01-17 RX ADMIN — PANTOPRAZOLE SODIUM SCH MG: 40 TABLET, DELAYED RELEASE ORAL at 07:00

## 2020-01-17 RX ADMIN — DOCUSATE SODIUM SCH MG: 100 CAPSULE, LIQUID FILLED ORAL at 14:31

## 2020-01-17 RX ADMIN — SERTRALINE HYDROCHLORIDE SCH MG: 50 TABLET ORAL at 12:05

## 2020-01-17 RX ADMIN — Medication PRN MG: at 21:12

## 2020-01-17 RX ADMIN — Medication SCH MG: at 21:12

## 2020-01-17 RX ADMIN — TRAZODONE HYDROCHLORIDE SCH MG: 50 TABLET ORAL at 21:11

## 2020-01-17 RX ADMIN — Medication SCH TAB: at 12:05

## 2020-01-18 RX ADMIN — TRAZODONE HYDROCHLORIDE SCH MG: 50 TABLET ORAL at 21:33

## 2020-01-18 RX ADMIN — DOCUSATE SODIUM SCH MG: 100 CAPSULE, LIQUID FILLED ORAL at 21:33

## 2020-01-18 RX ADMIN — DOCUSATE SODIUM SCH: 100 CAPSULE, LIQUID FILLED ORAL at 07:00

## 2020-01-18 RX ADMIN — DOCUSATE SODIUM SCH MG: 100 CAPSULE, LIQUID FILLED ORAL at 14:29

## 2020-01-18 RX ADMIN — Medication PRN MG: at 21:34

## 2020-01-18 RX ADMIN — DIVALPROEX SODIUM SCH MG: 500 TABLET, DELAYED RELEASE ORAL at 21:34

## 2020-01-18 RX ADMIN — PANTOPRAZOLE SODIUM SCH: 40 TABLET, DELAYED RELEASE ORAL at 07:00

## 2020-01-18 RX ADMIN — DIVALPROEX SODIUM SCH MG: 500 TABLET, DELAYED RELEASE ORAL at 11:04

## 2020-01-18 RX ADMIN — MAGNESIUM HYDROXIDE PRN ML: 400 SUSPENSION ORAL at 21:35

## 2020-01-18 RX ADMIN — Medication SCH MG: at 21:34

## 2020-01-18 RX ADMIN — SERTRALINE HYDROCHLORIDE SCH MG: 50 TABLET ORAL at 11:05

## 2020-01-18 RX ADMIN — Medication SCH TAB: at 11:05

## 2020-01-19 RX ADMIN — PANTOPRAZOLE SODIUM SCH: 40 TABLET, DELAYED RELEASE ORAL at 07:31

## 2020-01-19 RX ADMIN — Medication SCH MG: at 21:54

## 2020-01-19 RX ADMIN — Medication SCH TAB: at 10:04

## 2020-01-19 RX ADMIN — MAGNESIUM HYDROXIDE PRN ML: 400 SUSPENSION ORAL at 23:07

## 2020-01-19 RX ADMIN — DOCUSATE SODIUM SCH MG: 100 CAPSULE, LIQUID FILLED ORAL at 21:53

## 2020-01-19 RX ADMIN — DOCUSATE SODIUM SCH: 100 CAPSULE, LIQUID FILLED ORAL at 07:30

## 2020-01-19 RX ADMIN — TRAZODONE HYDROCHLORIDE SCH MG: 50 TABLET ORAL at 21:53

## 2020-01-19 RX ADMIN — DOCUSATE SODIUM SCH: 100 CAPSULE, LIQUID FILLED ORAL at 14:53

## 2020-01-19 RX ADMIN — DIVALPROEX SODIUM SCH MG: 500 TABLET, DELAYED RELEASE ORAL at 10:04

## 2020-01-19 RX ADMIN — DIVALPROEX SODIUM SCH MG: 500 TABLET, DELAYED RELEASE ORAL at 22:46

## 2020-01-19 RX ADMIN — SERTRALINE HYDROCHLORIDE SCH MG: 50 TABLET ORAL at 10:04

## 2020-01-20 VITALS — DIASTOLIC BLOOD PRESSURE: 76 MMHG | SYSTOLIC BLOOD PRESSURE: 115 MMHG | HEART RATE: 90 BPM

## 2020-01-20 RX ADMIN — DOCUSATE SODIUM SCH MG: 100 CAPSULE, LIQUID FILLED ORAL at 21:42

## 2020-01-20 RX ADMIN — PANTOPRAZOLE SODIUM SCH MG: 40 TABLET, DELAYED RELEASE ORAL at 06:20

## 2020-01-20 RX ADMIN — SERTRALINE HYDROCHLORIDE SCH MG: 50 TABLET ORAL at 10:54

## 2020-01-20 RX ADMIN — Medication SCH MG: at 21:42

## 2020-01-20 RX ADMIN — DIVALPROEX SODIUM SCH MG: 500 TABLET, DELAYED RELEASE ORAL at 21:42

## 2020-01-20 RX ADMIN — TRAZODONE HYDROCHLORIDE SCH MG: 50 TABLET ORAL at 21:42

## 2020-01-20 RX ADMIN — DOCUSATE SODIUM SCH MG: 100 CAPSULE, LIQUID FILLED ORAL at 06:20

## 2020-01-20 RX ADMIN — MAGNESIUM HYDROXIDE PRN ML: 400 SUSPENSION ORAL at 10:56

## 2020-01-20 RX ADMIN — DIVALPROEX SODIUM SCH MG: 500 TABLET, DELAYED RELEASE ORAL at 10:56

## 2020-01-20 RX ADMIN — Medication SCH TAB: at 10:54

## 2020-01-20 RX ADMIN — DOCUSATE SODIUM SCH: 100 CAPSULE, LIQUID FILLED ORAL at 14:17

## 2020-01-20 NOTE — PN
Psychiatric Progress Note


Vital Signs: 


 Vital Signs











 Period  Temp  Pulse  Resp  BP Sys/Toscano  Pulse Ox


 


 Last 24 Hr    90  18-18  115/76  











Date of Session: 01/20/20


Chief Complaint:: " I take 600mg of seroquel."


HPI: Patient admitted to  for alcohol and cocaine dependence. Consultation 

ordered as patient is requesting an increase in seroquel.


ROS: Patient is alert +oriented x X3.


Current Medications: 


Active Medications











Generic Name Dose Route Start Last Admin





  Trade Name Freq  PRN Reason Stop Dose Admin


 


Acetaminophen  650 mg  01/11/20 14:09  01/12/20 06:44





  Tylenol -  PO   650 mg





  Q6H PRN   Administration





  FEVER   





     





     





     


 


Al Hydroxide/Mg Hydroxide  30 ml  01/11/20 14:09  





  Mylanta Oral Suspension -  PO   





  Q6H PRN   





  DYSPEPSIA   





     





     





     


 


Divalproex Sodium  500 mg  01/12/20 11:15  01/20/20 10:56





  Depakote -  PO   500 mg





  BID MARYCARMEN   Administration





     





     





     





     


 


Docusate Sodium  100 mg  01/14/20 14:00  01/20/20 14:17





  Colace -  PO   Not Given





  TID MARYCARMEN   





     





     





     





     


 


Eucalyptus/Menthol/Phenol/Sorbitol  1 each  01/11/20 14:09  





  Cepastat Lozenge -  MM   





  Q4H PRN   





  SORE THROAT   





     





     





     


 


Guaifenesin  10 ml  01/11/20 14:09  





  Robitussin -  PO   





  Q6H PRN   





  COUGH   





     





     





     


 


Hydroxyzine Pamoate  25 mg  01/11/20 14:09  01/16/20 21:35





  Vistaril -  PO   25 mg





  Q6H PRN   Administration





  AGITATION   





     





     





     


 


Ibuprofen  400 mg  01/11/20 14:09  01/14/20 06:56





  Motrin -  PO   400 mg





  Q6H PRN   Administration





  Pain Level 4-6   





     





     





     


 


Loperamide HCl  4 mg  01/11/20 14:09  





  Imodium -  PO   





  Q6H PRN   





  DIARRHEA   





     





     





     


 


Magnesium Hydroxide  30 ml  01/11/20 14:09  01/20/20 10:56





  Milk Of Magnesia -  PO   30 ml





  DAILY PRN   Administration





  CONSTIPATION   





     





     





     


 


Melatonin  5 mg  01/11/20 22:00  01/18/20 21:34





  Melatonin  PO   5 mg





  HS PRN   Administration





  INSOMNIA   





     





     





     


 


Nicotine Polacrilex  2 mg  01/11/20 14:13  





  Nicorette Gum -  BUC   





  Q2H PRN   





  NICOTINE REPLACEMENT RX   





     





     





     


 


Pantoprazole Sodium  40 mg  01/15/20 06:00  01/20/20 06:20





  Protonix -  PO   40 mg





  DAILY@0600 MARYCARMEN   Administration





     





     





     





     


 


Prenatal Multivit/Folic Acid/Iron  1 tab  01/12/20 10:00  01/20/20 10:54





  Prenatal Vitamins (Sjr) -  PO   1 tab





  DAILY MARYCARMEN   Administration





     





     





     





     


 


Pseudoephedrine/Triprolidine  1 combo  01/11/20 14:09  





  Actifed -  PO   





  TID PRN   





  NASAL CONGESTION   





     





     





     


 


Quetiapine Fumarate  300 mg  01/16/20 22:00  01/19/20 21:54





  Seroquel -  PO   300 mg





  HS MARYCARMEN   Administration





     





     





     





     


 


Sertraline HCl  100 mg  01/12/20 11:30  01/20/20 10:54





  Zoloft -  PO   100 mg





  DAILY MARYCARMEN   Administration





     





     





     





     


 


Thiamine HCl  100 mg  01/11/20 22:00  01/19/20 21:54





  Vitamin B1 -  PO   100 mg





  HS MARYCARMEN   Administration





     





     





     





     


 


Trazodone HCl  150 mg  01/12/20 22:00  01/19/20 21:53





  Desyrel -  PO   150 mg





  HS MARYCARMEN   Administration





     





     





     





     











Medication(s) Change(s): No.


Current Side Effect: No


Lab tests ordered: No


Lab tests reviewed: Yes


Provider note:: Patient seen by Dr. Stanley and Dr. James. Dr. Stanley and Dr. James's note read and appreciated. Today patient is requesting an increase in 

seroquel dose. States that he is prescribed seroquel 600mg. Notes reviewed and 

noted that prescriptions were verified by Dr. Stanley. In addition patient is 

scheduled for discharge tomorrow morning. Patient informed that medications 

will not be ajusted the day before discharge. Patient denies psychotic, manic 

or depressive symptoms. States that he is ready for discharge as he is schedule 

to continue treatment at the [facility]. Patient encouraged to speak to 

the psychiatrist at the [facility] concerning his medications. Patient 

satisifed and receptive to feedback.


Total face to face time:: 25





Mental Status Exam





- Mental Status Exam


Alert and Oriented to: Time, Place, Person


Cognitive Function: Good


Patient Appearance: Well Groomed


Mood: Euthymic


Affect: Appropriate


Patient Behavior: Appropriate, Cooperative


Speech Pattern: Appropriate


Voice Loudness: Normal


Thought Process: Intact, Goal Oriented


Thought Disorder: Not Present


Hallucinations: Denies


Suicidal Ideation: Denies


Insight/Judgement: Fair


Sleep: Fair


Appetite: Good


Muscle strength/Tone: Normal


Gait/Station: Other (Ambulates with a cane.)





Psychiatric Treatment Plan





- Problem List


(1) Alcohol dependence


Current Visit: Yes   Comment: .   





(2) Substance induced mood disorder


Current Visit: Yes   Comment: .   





(3) Substance-induced sleep disorder


Current Visit: Yes   





(4) Schizoaffective disorder


Current Visit: Yes   Comment: .   





(5) Cannabis dependence


Current Visit: No   





(6) Nicotine dependence


Current Visit: Yes   


Qualifiers: 


   Nicotine product type: cigarettes   Substance use status: uncomplicated   

Qualified Code(s): F17.210 - Nicotine dependence, cigarettes, uncomplicated   


Comment: .

## 2020-01-21 RX ADMIN — DOCUSATE SODIUM SCH: 100 CAPSULE, LIQUID FILLED ORAL at 07:21

## 2020-01-21 RX ADMIN — DIVALPROEX SODIUM SCH MG: 500 TABLET, DELAYED RELEASE ORAL at 13:19

## 2020-01-21 RX ADMIN — SERTRALINE HYDROCHLORIDE SCH MG: 50 TABLET ORAL at 13:18

## 2020-01-21 RX ADMIN — DOCUSATE SODIUM SCH MG: 100 CAPSULE, LIQUID FILLED ORAL at 13:47

## 2020-01-21 RX ADMIN — PANTOPRAZOLE SODIUM SCH: 40 TABLET, DELAYED RELEASE ORAL at 07:22

## 2020-01-21 RX ADMIN — Medication SCH TAB: at 13:18

## 2020-01-21 NOTE — PN
[facility] Progress Note


Note: 





Patient is discharged today. Scripts for 30 days supply of medications(Depakote 

500 mg/bid, Zoloft 100 mg/day, Seroquel 300 mg/hs, Trazadone 150 mg/hs) are 

electronically transmitted to [facility] at [address] [place], [place] [id number]

## 2020-01-21 NOTE — DS
[facility] Rehab Discharge Summary





- [facility] Rehab Discharge Summary


Admission Date: 01/11/20


Discharge Date: 01/21/20





- History


Present History: Alcohol dependence, Cannabis dependence


Additional Comments: 





pt is a 53 y/o male with a hx of MARIANA admitted to rehab and discharged today.


Pertinent Past History: 





GERD


Gastritis


Use of crutches for ambulation-Hx of use of walker/cane for ambulation


Hx of falls


Schizoaffective Disorder


Bipolar Disorder








- Discharge Physical Exam


Vital Signs: 


 Vital Signs











Temperature  0 F L  01/18/20 10:00


 


Pulse Rate  90   01/20/20 07:30


 


Respiratory Rate  18   01/21/20 03:30


 


Blood Pressure  115/76   01/20/20 07:30


 


O2 Sat by Pulse Oximetry (%)      








Alert o x 3,denies s/h/i


nad


oob ambulating with crutches


cardiac:s1 s2, rrr


lungs;cta,millicent.


abdomen;+bs,soft,nt,nd


extremities/skin;no edema; active leg raise while sitting down; skin intact.


Pertinent Admission Physical Exam Findings: 





status stable from detox admission








- Treatment


Discharge Condition: Discharge condition good


Hospital Course: 





Rehabilitated safely


CD aftercare referral accepted.





- Medication


Discharge Medications: 


Ambulatory Orders





Sertraline HCl [Zoloft -] 200 mg PO DAILY #30 tablet 01/29/18 


Quetiapine Fumarate [Seroquel -] 100 mg PO DAILY 01/07/20 


Quetiapine Fumarate [Seroquel -] 200 mg PO HS 01/07/20 


Ranitidine HCl [Zantac 75] 50 mg PO DAILY 01/07/20 


traZODone HCL [Desyrel -] 200 mg PO HS 01/07/20 


Divalproex [Depakote -] 500 mg PO BID #60 tablet.ec 01/21/20 


Docusate Sodium [Colace -] 100 mg PO TID #90 capsule 01/21/20 


Quetiapine Fumarate [Seroquel -] 300 mg PO HS #30 tablet 01/21/20 


Sertraline HCl [Zoloft] 100 mg PO DAILY #30 tablet 01/21/20 


Trazodone HCl 150 mg PO HS #30 tablet 01/21/20 











- Medication-Assisted Treatment (MAT)


Medication-Assisted Treatment (MAT): No





- Discharge Instructions


Diet, activity, other medical instructions: 





Diet:Regular





Activity: oob ad lazara with crutch as aid





Other medical instructions:follow up with CD aftercare at [facility] as 

scheduled. 


follow up  with your  primary care at [facility] every 3 months 

as scheduled.








- Diagnosis


(1) Alcohol dependence


Status: Chronic   


Qualifiers: 


   Substance use status: uncomplicated   Qualified Code(s): F10.20 - Alcohol 

dependence, uncomplicated   





(2) Nicotine dependence


Status: Chronic   


Qualifiers: 


   Nicotine product type: cigarettes   Substance use status: uncomplicated   

Qualified Code(s): F17.210 - Nicotine dependence, cigarettes, uncomplicated   





(3) Cannabis dependence


Status: Chronic   





(4) Constipation


Status: Chronic   


Qualifiers: 


   Constipation type: unspecified constipation type   Qualified Code(s): K59.00 

- Constipation, unspecified   





(5) ARTHRITS


Status: Chronic   





(6) Crutches as ambulation aid


Status: Chronic   





(7) GERD (gastroesophageal reflux disease)


Status: Chronic   


Qualifiers: 


   Esophagitis presence: esophagitis presence not specified   Qualified Code(s)

: K21.9 - Gastro-esophageal reflux disease without esophagitis   





(8) Gastritis


Status: Chronic   





(9) Risk for falls


Status: Chronic   





- Follow-up Referral


Minutes to complete discharge: 20





- AMA


Did Patient Leave Against Medical Advice: No

## 2020-02-10 ENCOUNTER — EMERGENCY (EMERGENCY)
Facility: HOSPITAL | Age: 55
LOS: 1 days | Discharge: HOME | End: 2020-02-10
Attending: EMERGENCY MEDICINE | Admitting: EMERGENCY MEDICINE
Payer: MEDICAID

## 2020-02-10 VITALS
OXYGEN SATURATION: 97 % | HEART RATE: 69 BPM | RESPIRATION RATE: 17 BRPM | SYSTOLIC BLOOD PRESSURE: 153 MMHG | DIASTOLIC BLOOD PRESSURE: 88 MMHG | TEMPERATURE: 98 F | WEIGHT: 169.98 LBS

## 2020-02-10 DIAGNOSIS — Z98.89 OTHER SPECIFIED POSTPROCEDURAL STATES: Chronic | ICD-10-CM

## 2020-02-10 PROCEDURE — 99220: CPT

## 2020-02-10 NOTE — ED PROVIDER NOTE - CARE PLAN
Principal Discharge DX:	Altered mental status associated with intoxication  Secondary Diagnosis:	Alcohol abuse  Secondary Diagnosis:	Drug abuse
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
## 2020-02-11 PROCEDURE — 99217: CPT

## 2020-02-11 NOTE — ED CDU PROVIDER DISPOSITION NOTE - PATIENT PORTAL LINK FT
You can access the FollowMyHealth Patient Portal offered by [facility] by registering at the following website: [email or web address]. By joining [employer]’s FollowMyHealth portal, you will also be able to view your health information using other applications (apps) compatible with our system.

## 2020-02-11 NOTE — ED ADULT NURSE NOTE - OBJECTIVE STATEMENT
54y male presents to ED for alcohol intoxication. Pt verbally abusive when spoken to by nurse, able to speak loudly in full and complete sentences. Sleeping in chair, refuses to answer questions.

## 2020-02-11 NOTE — ED CDU PROVIDER DISPOSITION NOTE - CLINICAL COURSE
pt placed on obs for ams, likely 2/2 polysubstance use, now ao x 3, clear speech, baseline gait, clinically sober,

## 2020-02-15 DIAGNOSIS — F10.129 ALCOHOL ABUSE WITH INTOXICATION, UNSPECIFIED: ICD-10-CM

## 2020-02-15 DIAGNOSIS — Z88.0 ALLERGY STATUS TO PENICILLIN: ICD-10-CM

## 2020-02-23 ENCOUNTER — EMERGENCY (EMERGENCY)
Facility: HOSPITAL | Age: 55
LOS: 1 days | Discharge: HOME | End: 2020-02-23
Admitting: EMERGENCY MEDICINE
Payer: MEDICAID

## 2020-02-23 VITALS
SYSTOLIC BLOOD PRESSURE: 121 MMHG | TEMPERATURE: 98 F | RESPIRATION RATE: 18 BRPM | HEIGHT: 68 IN | HEART RATE: 89 BPM | DIASTOLIC BLOOD PRESSURE: 84 MMHG | WEIGHT: 149.91 LBS | OXYGEN SATURATION: 97 %

## 2020-02-23 DIAGNOSIS — F10.129 ALCOHOL ABUSE WITH INTOXICATION, UNSPECIFIED: ICD-10-CM

## 2020-02-23 DIAGNOSIS — Z98.89 OTHER SPECIFIED POSTPROCEDURAL STATES: Chronic | ICD-10-CM

## 2020-02-23 PROCEDURE — L9991: CPT

## 2020-02-29 ENCOUNTER — EMERGENCY (EMERGENCY)
Facility: HOSPITAL | Age: 55
LOS: 1 days | Discharge: HOME | End: 2020-02-29
Attending: EMERGENCY MEDICINE | Admitting: EMERGENCY MEDICINE
Payer: MEDICAID

## 2020-02-29 VITALS
DIASTOLIC BLOOD PRESSURE: 77 MMHG | SYSTOLIC BLOOD PRESSURE: 120 MMHG | WEIGHT: 210.1 LBS | HEIGHT: 68 IN | OXYGEN SATURATION: 99 % | TEMPERATURE: 98 F | HEART RATE: 94 BPM | RESPIRATION RATE: 17 BRPM

## 2020-02-29 DIAGNOSIS — Z98.89 OTHER SPECIFIED POSTPROCEDURAL STATES: Chronic | ICD-10-CM

## 2020-02-29 PROCEDURE — 99284 EMERGENCY DEPT VISIT MOD MDM: CPT

## 2020-02-29 NOTE — ED PROVIDER NOTE - PATIENT PORTAL LINK FT
You can access the FollowMyHealth Patient Portal offered by [facility] by registering at the following website: [email or web address]. By joining [employer]’s FollowMyHealth portal, you will also be able to view your health information using other applications (apps) compatible with our system.

## 2020-02-29 NOTE — ED ADULT NURSE NOTE - CHPI ED NUR SYMPTOMS NEG
no weakness/no abdominal distension/no fever/no vomiting/no abdominal pain/no chills/no nausea/no pain
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
## 2020-02-29 NOTE — ED ADULT NURSE REASSESSMENT NOTE - NS ED NURSE REASSESS COMMENT FT1
Pt noted to be cursing in his room when environmental services walked into pt's room. Pt masturbating and told to stop.  Pt continuing to yell and curse at staff.
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
## 2020-02-29 NOTE — ED PROVIDER NOTE - ENMT, MLM
Airway patent. Airway patent, protecting airway. Nasal mucosa clear. MMM. No scalp hematoma and no apparent c-spine tenderness.

## 2020-02-29 NOTE — ED ADULT NURSE NOTE - CAS EDN DISCHARGE ASSESSMENT
pt yelling profanity and racial slurs as he left the ED/Alert and oriented to person, place and time
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
## 2020-02-29 NOTE — ED PROVIDER NOTE - CONSTITUTIONAL, MLM
normal... Well appearing, awake, and in no apparent distress. Severe EtOH intox, awake, and in no apparent distress. Fair hygiene.

## 2020-02-29 NOTE — ED PROVIDER NOTE - CLINICAL SUMMARY MEDICAL DECISION MAKING FREE TEXT BOX
55 y/o Male BIBA for AMS, pt admits to drinking 2 pints of vodka this evening. Pt with no obvious signs of trauma or injuries. Will let patient metabolize and DC when patient is stable.

## 2020-02-29 NOTE — ED ADULT NURSE NOTE - NSIMPLEMENTINTERV_GEN_ALL_ED
Implemented All Fall Risk Interventions:  [place] to call system. Call bell, personal items and telephone within reach. Instruct patient to call for assistance. Room bathroom lighting operational. Non-slip footwear when patient is off stretcher. Physically safe environment: no spills, clutter or unnecessary equipment. Stretcher in lowest position, wheels locked, appropriate side rails in place. Provide visual cue, wrist band, yellow gown, etc. Monitor gait and stability. Monitor for mental status changes and reorient to person, place, and time. Review medications for side effects contributing to fall risk. Reinforce activity limits and safety measures with patient and family.

## 2020-02-29 NOTE — ED ADULT NURSE NOTE - CCCP TRG CHIEF CMPLNT
Discharge Summary    Patient: Christy Barber               Sex: male          DOA: 6/14/2018         YOB: 1950      Age:  79 y.o.        LOS:  LOS: 1 day                Admit Date: 6/14/2018    Discharge Date: 6/15/2018    Admission Diagnoses: n52.35 erecticle dysfunction following radiation theraphy  Erectile dysfunction    Discharge Diagnoses:    Problem List as of 6/15/2018  Date Reviewed: 6/14/2018          Codes Class Noted - Resolved    CAD (coronary artery disease) ICD-10-CM: I25.10  ICD-9-CM: 414.00  Unknown - Present        CHF (congestive heart failure) (HCC) ICD-10-CM: I50.9  ICD-9-CM: 428.0  Unknown - Present        Heart disease ICD-10-CM: I51.9  ICD-9-CM: 429.9  Unknown - Present        HTN (hypertension) ICD-10-CM: I10  ICD-9-CM: 401.9  Unknown - Present        PAD (peripheral artery disease) ([facility] ICD-10-CM: I73.9  ICD-9-CM: 443.9  8/25/2017 - Present        Erectile dysfunction ICD-10-CM: N52.9  ICD-9-CM: 607.84  Unknown - Present        Hypercholesterolemia ICD-10-CM: E78.00  ICD-9-CM: 272.0  Unknown - Present        Personal history of prostate cancer ICD-10-CM: Z85.46  ICD-9-CM: V10.46  Unknown - Present        UTI (urinary tract infection) ICD-10-CM: N39.0  ICD-9-CM: 599.0  Unknown - Present        Kidney stone ICD-10-CM: N20.0  ICD-9-CM: 592.0  Unknown - Present        Benign hypertensive heart disease with congestive heart failure and with combined systolic and diastolic dysfunction (HCC) ICD-10-CM: I11.0, I50.40  ICD-9-CM: 402.11, 428.40, 428.0  Unknown - Present        History of prostate cancer ICD-10-CM: Z85.46  ICD-9-CM: V10.46  Unknown - Present        History of external beam radiation therapy ICD-10-CM: Z92.3  ICD-9-CM: V15.3  Unknown - Present        Essential hypertension ICD-10-CM: I10  ICD-9-CM: 401.9  7/11/2016 - Present        Vitamin D deficiency (Chronic) ICD-10-CM: E55.9  ICD-9-CM: 268.9  5/10/2016 - Present    Overview Signed 5/10/2016  1:54 PM by Tanika Gray Jose Lucero MD     Vitamin D 25-Hydroxy (5/10/2016) = 16.8             S/P CABG x 2 ICD-10-CM: Z95.1  ICD-9-CM: V45.81  Unknown - Present    Overview Signed 5/9/2016  3:56 PM by Panchito Pathak MD     2009             Dyslipidemia (Chronic) ICD-10-CM: E78.5  ICD-9-CM: 272.4  Unknown - Present        Cardiomyopathy ([address] (Chronic) ICD-10-CM: I42.9  ICD-9-CM: 425.4  Unknown - Present    Overview Signed 5/9/2016  4:05 PM by Panchito aPthak MD     LVEF 35-40% (01/16)             Decreased calculated glomerular filtration rate (GFR) ICD-10-CM: R94.4  ICD-9-CM: 794.4  5/6/2016 - Present    Overview Signed 5/9/2016  3:54 PM by Panchito Pathak MD     Calculated GFR equivalent to that of CKD stage 2 = 60-89 ml/min             Status post spinal surgery ICD-10-CM: Z98.890  ICD-9-CM: V45.89  4/29/2016 - Present    Overview Signed 5/9/2016  4:01 PM by Panchito Pathak MD     S/P T7 corpectomy with spinal canal decompression; Placement of expandable cage T6 to T8, that being Oelrick 22 mm diameter cage;  Anterior spinal fusion T6 to T8, with bone bank bone graft and InterFuse; Polaris 5.5 mm pedicle instrumentation T3-T11; Posterior spinal fusion T3-T11 with bone bank bone graft and InterFuse (4/29/2016 - Dr. Carolina Wilcox)             Methicillin resistant Staphylococcus aureus infection ICD-10-CM: A49.02  ICD-9-CM: 041.12  4/29/2016 - Present        Osteomyelitis of vertebra of thoracic region [facility]) ICD-10-CM: E80.28  ICD-9-CM: 730.28  4/29/2016 - Present        Discitis of thoracic region ICD-10-CM: M46.44  ICD-9-CM: 722.92  4/23/2016 - Present        Coronary artery disease involving native coronary artery of native heart without angina pectoris (Chronic) ICD-10-CM: I25.10  ICD-9-CM: 414.01  1/16/2016 - Present        Malignant neoplasm of prostate ([facility] ICD-10-CM: C61  ICD-9-CM: 185  Unknown - Present        History of kidney stones ICD-10-CM: Z87.442  ICD-9-CM: V13.01  Unknown - Present        Bronchial asthma (Chronic) altered mental status ICD-10-CM: J45.909  ICD-9-CM: 493.90  Unknown - Present        Hypogonadism in male (Chronic) ICD-10-CM: E29.1  ICD-9-CM: 257.2  3/26/2015 - Present        History of vitamin D deficiency ICD-10-CM: Z86.39  ICD-9-CM: V12.1  9/23/2014 - Present        Chronic combined systolic and diastolic heart failure (HCC) (Chronic) ICD-10-CM: I50.42  ICD-9-CM: 428.42  4/21/2014 - Present        Atherosclerosis of coronary artery bypass graft ICD-10-CM: I25.810  ICD-9-CM: 414.04  4/21/2014 - Present        Elevated PSA ICD-10-CM: R97.20  ICD-9-CM: 790.93  Unknown - Present        RESOLVED: Infection ICD-10-CM: B99.9  ICD-9-CM: 136.9  1/15/2016 - 1/16/2016        RESOLVED: Spinal cord lesion (HCC) ICD-10-CM: G95.9  ICD-9-CM: 336.9  1/11/2016 - 1/16/2016        RESOLVED: CAD (coronary artery disease) ICD-10-CM: I25.10  ICD-9-CM: 414.00  Unknown - 1/16/2016        RESOLVED: History of coronary artery bypass surgery ICD-10-CM: Z95.1  ICD-9-CM: V45.81  4/21/2014 - 1/16/2016              Discharge Condition: Good    Hospital Course: uneventful    Consults: None    Significant Diagnostic Studies: none    Discharge Medications:     Current Discharge Medication List      START taking these medications    Details   docusate sodium (COLACE) 100 mg capsule Take 1 Cap by mouth daily for 30 days. Continue while on narcotic pain medication to help prevent constipation  Qty: 30 Cap, Refills: 0      ciprofloxacin HCl (CIPRO) 500 mg tablet Take 1 Tab by mouth two (2) times a day for 14 days. Qty: 28 Tab, Refills: 0         CONTINUE these medications which have CHANGED    Details   oxyCODONE-acetaminophen (PERCOCET) 5-325 mg per tablet Take 1 Tab by mouth every four (4) hours as needed for Pain. Max Daily Amount: 6 Tabs. May take 2 tabs for severe pain. Avoid driving while on this medication.  Do not exceed 4000mg of acetaminophen per day  Qty: 10 Tab, Refills: 0         CONTINUE these medications which have NOT CHANGED    Details   umeclidinium (INCRUSE ELLIPTA) 62.5 mcg/actuation inhaler Take  inhaled by mouth. Associated Diagnoses: Other male erectile dysfunction; Urinary tract infection without hematuria, site unspecified      SYMBICORT 160-4.5 mcg/actuation HFAA Refills: 5      clopidogrel (PLAVIX) 75 mg tab Refills: 2      atorvastatin (LIPITOR) 80 mg tablet Refills: 5      carvedilol (COREG) 6.25 mg tablet Refills: 3      FLOMAX 0.4 mg capsule Take 1 Cap by mouth two (2) times a day. Qty: 60 Cap, Refills: 11      lisinopril (PRINIVIL, ZESTRIL) 2.5 mg tablet Take 1 Tab by mouth daily. [12:00 PM]  Indications: CHRONIC HEART FAILURE  Qty: 15 Tab, Refills: 0    Associated Diagnoses: Benign hypertensive heart disease with congestive heart failure and with combined systolic and diastolic dysfunction ([address] Chronic combined systolic and diastolic heart failure (HCC)      albuterol (VENTOLIN HFA) 90 mcg/actuation inhaler Take 2 Puffs by inhalation every six (6) hours as needed for Wheezing.      montelukast (SINGULAIR) 10 mg tablet Take 10 mg by mouth nightly. Activity: No lifting, Driving, or Strenuous exercise for 3 weeks    Diet: Regular Diet    Wound Care: As directed    Follow-up: As scheduled July 7th with Dr Bree Meier for post-op check    Pardeep Spivey MD - [address] fellow    I was present and have independently reviewed the diagnosis and discussed the plan with Kimberly Fontanez MD , and I agree. Hiram Quiroz MD, MD  [facility]  [facility].  Denise Cifuentes
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
## 2020-03-04 DIAGNOSIS — F10.120 ALCOHOL ABUSE WITH INTOXICATION, UNCOMPLICATED: ICD-10-CM

## 2020-03-04 DIAGNOSIS — R41.82 ALTERED MENTAL STATUS, UNSPECIFIED: ICD-10-CM

## 2020-03-21 ENCOUNTER — EMERGENCY (EMERGENCY)
Facility: HOSPITAL | Age: 55
LOS: 1 days | Discharge: HOME | End: 2020-03-21
Admitting: EMERGENCY MEDICINE
Payer: MEDICAID

## 2020-03-21 VITALS
HEIGHT: 68 IN | WEIGHT: 154.98 LBS | RESPIRATION RATE: 17 BRPM | OXYGEN SATURATION: 97 % | TEMPERATURE: 98 F | HEART RATE: 104 BPM | SYSTOLIC BLOOD PRESSURE: 119 MMHG | DIASTOLIC BLOOD PRESSURE: 77 MMHG

## 2020-03-21 DIAGNOSIS — F10.10 ALCOHOL ABUSE, UNCOMPLICATED: ICD-10-CM

## 2020-03-21 DIAGNOSIS — Z98.89 OTHER SPECIFIED POSTPROCEDURAL STATES: Chronic | ICD-10-CM

## 2020-03-21 DIAGNOSIS — R41.82 ALTERED MENTAL STATUS, UNSPECIFIED: ICD-10-CM

## 2020-03-21 PROCEDURE — 99284 EMERGENCY DEPT VISIT MOD MDM: CPT
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
## 2020-03-21 NOTE — ED PROVIDER NOTE - CLINICAL SUMMARY MEDICAL DECISION MAKING FREE TEXT BOX
53 y/o M is BIB EMS for suspected alcohol intoxication.  Pt has had previous visits to this ED for substance abuse. No evidence of head or extremity trauma.  No vital sign derangements.  Abdomen not distended.  Will observe to clinical sobriety.

## 2020-03-21 NOTE — ED ADULT TRIAGE NOTE - CHIEF COMPLAINT QUOTE
Pt BIBA for AMS. Pt admits to ETOH use. Pt has crutches at bedside. Pt has no sign of trauma to head. As per EMS pt noted to be combative at times

## 2020-03-21 NOTE — ED PROVIDER NOTE - OBJECTIVE STATEMENT
53 y/o M with PMHx of polysubstance abuse is BIB EMS for AMS s/p alcohol intoxication. Pt admits to heavy alcohol use today. On arrival, Pt is AOx3. Pt denies any other medical complaints including CP, SOB, Abd pain, fevers, chills, and N/V/D.
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
## 2020-03-21 NOTE — ED ADULT NURSE NOTE - NSIMPLEMENTINTERV_GEN_ALL_ED
Implemented All Fall Risk Interventions:  [place] to call system. Call bell, personal items and telephone within reach. Instruct patient to call for assistance. Room bathroom lighting operational. Non-slip footwear when patient is off stretcher. Physically safe environment: no spills, clutter or unnecessary equipment. Stretcher in lowest position, wheels locked, appropriate side rails in place. Provide visual cue, wrist band, yellow gown, etc. Monitor gait and stability. Monitor for mental status changes and reorient to person, place, and time. Review medications for side effects contributing to fall risk. Reinforce activity limits and safety measures with patient and family.

## 2020-03-21 NOTE — ED PROVIDER NOTE - PATIENT PORTAL LINK FT
You can access the FollowMyHealth Patient Portal offered by [facility] by registering at the following website: [email or web address]. By joining [employer]’s FollowMyHealth portal, you will also be able to view your health information using other applications (apps) compatible with our system.
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
## 2020-04-05 ENCOUNTER — EMERGENCY (EMERGENCY)
Facility: HOSPITAL | Age: 55
LOS: 1 days | Discharge: HOME | End: 2020-04-05
Admitting: EMERGENCY MEDICINE
Payer: MEDICAID

## 2020-04-05 VITALS
HEART RATE: 100 BPM | DIASTOLIC BLOOD PRESSURE: 99 MMHG | SYSTOLIC BLOOD PRESSURE: 170 MMHG | OXYGEN SATURATION: 97 % | TEMPERATURE: 98 F | RESPIRATION RATE: 18 BRPM

## 2020-04-05 VITALS
DIASTOLIC BLOOD PRESSURE: 136 MMHG | HEART RATE: 118 BPM | SYSTOLIC BLOOD PRESSURE: 171 MMHG | RESPIRATION RATE: 18 BRPM | OXYGEN SATURATION: 93 % | TEMPERATURE: 98 F

## 2020-04-05 DIAGNOSIS — Z98.89 OTHER SPECIFIED POSTPROCEDURAL STATES: Chronic | ICD-10-CM

## 2020-04-05 LAB
ALBUMIN SERPL ELPH-MCNC: 3.8 G/DL — SIGNIFICANT CHANGE UP (ref 3.4–5)
ALP SERPL-CCNC: 54 U/L — SIGNIFICANT CHANGE UP (ref 40–120)
ALT FLD-CCNC: 40 U/L — SIGNIFICANT CHANGE UP (ref 12–42)
ANION GAP SERPL CALC-SCNC: 13 MMOL/L — SIGNIFICANT CHANGE UP (ref 9–16)
APPEARANCE UR: CLEAR — SIGNIFICANT CHANGE UP
APTT BLD: 28.3 SEC — SIGNIFICANT CHANGE UP (ref 27.5–36.3)
AST SERPL-CCNC: 38 U/L — HIGH (ref 15–37)
BASOPHILS # BLD AUTO: 0.02 K/UL — SIGNIFICANT CHANGE UP (ref 0–0.2)
BASOPHILS NFR BLD AUTO: 0.3 % — SIGNIFICANT CHANGE UP (ref 0–2)
BILIRUB SERPL-MCNC: 0.3 MG/DL — SIGNIFICANT CHANGE UP (ref 0.2–1.2)
BILIRUB UR-MCNC: NEGATIVE — SIGNIFICANT CHANGE UP
BUN SERPL-MCNC: 15 MG/DL — SIGNIFICANT CHANGE UP (ref 7–23)
CALCIUM SERPL-MCNC: 8.8 MG/DL — SIGNIFICANT CHANGE UP (ref 8.5–10.5)
CHLORIDE SERPL-SCNC: 98 MMOL/L — SIGNIFICANT CHANGE UP (ref 96–108)
CO2 SERPL-SCNC: 27 MMOL/L — SIGNIFICANT CHANGE UP (ref 22–31)
COLOR SPEC: YELLOW — SIGNIFICANT CHANGE UP
CREAT SERPL-MCNC: 0.76 MG/DL — SIGNIFICANT CHANGE UP (ref 0.5–1.3)
DIFF PNL FLD: ABNORMAL
EOSINOPHIL # BLD AUTO: 0.04 K/UL — SIGNIFICANT CHANGE UP (ref 0–0.5)
EOSINOPHIL NFR BLD AUTO: 0.6 % — SIGNIFICANT CHANGE UP (ref 0–6)
ETHANOL SERPL-MCNC: 333 MG/DL — HIGH
GLUCOSE SERPL-MCNC: 119 MG/DL — HIGH (ref 70–99)
GLUCOSE UR QL: NEGATIVE — SIGNIFICANT CHANGE UP
HCT VFR BLD CALC: 40.9 % — SIGNIFICANT CHANGE UP (ref 39–50)
HGB BLD-MCNC: 14.1 G/DL — SIGNIFICANT CHANGE UP (ref 13–17)
IMM GRANULOCYTES NFR BLD AUTO: 0.3 % — SIGNIFICANT CHANGE UP (ref 0–1.5)
INR BLD: 1.04 — SIGNIFICANT CHANGE UP (ref 0.88–1.16)
KETONES UR-MCNC: NEGATIVE — SIGNIFICANT CHANGE UP
LEUKOCYTE ESTERASE UR-ACNC: NEGATIVE — SIGNIFICANT CHANGE UP
LYMPHOCYTES # BLD AUTO: 1.37 K/UL — SIGNIFICANT CHANGE UP (ref 1–3.3)
LYMPHOCYTES # BLD AUTO: 21 % — SIGNIFICANT CHANGE UP (ref 13–44)
MAGNESIUM SERPL-MCNC: 1.9 MG/DL — SIGNIFICANT CHANGE UP (ref 1.6–2.6)
MCHC RBC-ENTMCNC: 33 PG — SIGNIFICANT CHANGE UP (ref 27–34)
MCHC RBC-ENTMCNC: 34.5 GM/DL — SIGNIFICANT CHANGE UP (ref 32–36)
MCV RBC AUTO: 95.8 FL — SIGNIFICANT CHANGE UP (ref 80–100)
MONOCYTES # BLD AUTO: 0.71 K/UL — SIGNIFICANT CHANGE UP (ref 0–0.9)
MONOCYTES NFR BLD AUTO: 10.9 % — SIGNIFICANT CHANGE UP (ref 2–14)
NEUTROPHILS # BLD AUTO: 4.36 K/UL — SIGNIFICANT CHANGE UP (ref 1.8–7.4)
NEUTROPHILS NFR BLD AUTO: 66.9 % — SIGNIFICANT CHANGE UP (ref 43–77)
NITRITE UR-MCNC: NEGATIVE — SIGNIFICANT CHANGE UP
NRBC # BLD: 0 /100 WBCS — SIGNIFICANT CHANGE UP (ref 0–0)
PCP SPEC-MCNC: SIGNIFICANT CHANGE UP
PH UR: 7 — SIGNIFICANT CHANGE UP (ref 5–8)
PLATELET # BLD AUTO: 258 K/UL — SIGNIFICANT CHANGE UP (ref 150–400)
POTASSIUM SERPL-MCNC: 3.5 MMOL/L — SIGNIFICANT CHANGE UP (ref 3.5–5.3)
POTASSIUM SERPL-SCNC: 3.5 MMOL/L — SIGNIFICANT CHANGE UP (ref 3.5–5.3)
PROT SERPL-MCNC: 7.6 G/DL — SIGNIFICANT CHANGE UP (ref 6.4–8.2)
PROT UR-MCNC: NEGATIVE MG/DL — SIGNIFICANT CHANGE UP
PROTHROM AB SERPL-ACNC: 11.5 SEC — SIGNIFICANT CHANGE UP (ref 10–12.9)
RBC # BLD: 4.27 M/UL — SIGNIFICANT CHANGE UP (ref 4.2–5.8)
RBC # FLD: 13.6 % — SIGNIFICANT CHANGE UP (ref 10.3–14.5)
SODIUM SERPL-SCNC: 138 MMOL/L — SIGNIFICANT CHANGE UP (ref 132–145)
SP GR SPEC: 1.01 — SIGNIFICANT CHANGE UP (ref 1–1.03)
UROBILINOGEN FLD QL: 0.2 E.U./DL — SIGNIFICANT CHANGE UP
WBC # BLD: 6.52 K/UL — SIGNIFICANT CHANGE UP (ref 3.8–10.5)
WBC # FLD AUTO: 6.52 K/UL — SIGNIFICANT CHANGE UP (ref 3.8–10.5)

## 2020-04-05 PROCEDURE — 70450 CT HEAD/BRAIN W/O DYE: CPT | Mod: 26

## 2020-04-05 PROCEDURE — 99285 EMERGENCY DEPT VISIT HI MDM: CPT

## 2020-04-05 PROCEDURE — 71250 CT THORAX DX C-: CPT | Mod: 26

## 2020-04-05 RX ORDER — MIDAZOLAM HYDROCHLORIDE 1 MG/ML
2 INJECTION, SOLUTION INTRAMUSCULAR; INTRAVENOUS ONCE
Refills: 0 | Status: DISCONTINUED | OUTPATIENT
Start: 2020-04-05 | End: 2020-04-05

## 2020-04-05 RX ADMIN — MIDAZOLAM HYDROCHLORIDE 2 MILLIGRAM(S): 1 INJECTION, SOLUTION INTRAMUSCULAR; INTRAVENOUS at 03:09

## 2020-04-05 NOTE — ED PROVIDER NOTE - PATIENT PORTAL LINK FT
You can access the FollowMyHealth Patient Portal offered by [facility] by registering at the following website: [email or web address]. By joining [employer]’s FollowMyHealth portal, you will also be able to view your health information using other applications (apps) compatible with our system.

## 2020-04-05 NOTE — ED ADULT NURSE NOTE - OBJECTIVE STATEMENT
I will STOP taking the medications listed below when I get home from the hospital:  None Pt comes in by EMS with altered mental status. Pt is not responding to verbal commands but will respond to pain.  Pt incontinent of urine on EMS stretcher.

## 2020-04-05 NOTE — ED ADULT NURSE NOTE - NSIMPLEMENTINTERV_GEN_ALL_ED
Implemented All Fall Risk Interventions:  [place] to call system. Call bell, personal items and telephone within reach. Instruct patient to call for assistance. Room bathroom lighting operational. Non-slip footwear when patient is off stretcher. Physically safe environment: no spills, clutter or unnecessary equipment. Stretcher in lowest position, wheels locked, appropriate side rails in place. Provide visual cue, wrist band, yellow gown, etc. Monitor gait and stability. Monitor for mental status changes and reorient to person, place, and time. Review medications for side effects contributing to fall risk. Reinforce activity limits and safety measures with patient and family.

## 2020-04-05 NOTE — ED PROVIDER NOTE - PROGRESS NOTE DETAILS
He is requests "2 blankets and a urinal".  He is now awake and alert.  Pt ambulated to bathroom with is personal crutch without assistance.

## 2020-04-05 NOTE — ED PROVIDER NOTE - CLINICAL SUMMARY MEDICAL DECISION MAKING FREE TEXT BOX
57 y/o M presents to ED with AMS with tachycardia and initial hypoxia of 93% on RA.  Pts O2 sat improved without intervention once placed on stretcher.  HR improved with rest.  Labs wnl.  .  CT head and CXR negative.  Pt 59 y/o M presents to ED with AMS with tachycardia and initial hypoxia of 93% on RA.  Pts O2 sat improved without intervention once placed on stretcher.  HR improved with rest.  Labs wnl.  .  CT head and CXR negative.  Pt observed in ED and discharged with clear speech and steady gait.

## 2020-04-05 NOTE — ED PROVIDER NOTE - OBJECTIVE STATEMENT
59 y/o M presents to ED via EMS with AMS.  EMS reports he may have used K2.  Pt presents non-verbal and not following commands but awake.  + urinary incontinence upon arrival.  HPI and ROS limited secondary to AMS.

## 2020-04-05 NOTE — ED ADULT NURSE REASSESSMENT NOTE - NS ED NURSE REASSESS COMMENT FT1
Checked in on patient in the bathroom, pt sitting on the toilet "having a crap" - left in peace
police assist escort out
pt care handed over to myself from rn phuong Marcos, pt walked out of room asking to use bathroom, walking with x1 crutch, pt stating his name is Estuardo Faith, Dr Noel aware of pt up and walking and able to give his name , registration aware also
pt becomes agitated when CT tech tried to arouse him to take him for his ct scan.  SALBADOR Melendez at bedside and orders received for sedation.

## 2020-04-07 ENCOUNTER — EMERGENCY (EMERGENCY)
Facility: HOSPITAL | Age: 55
LOS: 1 days | Discharge: HOME | End: 2020-04-07
Attending: STUDENT IN AN ORGANIZED HEALTH CARE EDUCATION/TRAINING PROGRAM | Admitting: EMERGENCY MEDICINE
Payer: MEDICAID

## 2020-04-07 VITALS
RESPIRATION RATE: 18 BRPM | TEMPERATURE: 99 F | SYSTOLIC BLOOD PRESSURE: 168 MMHG | DIASTOLIC BLOOD PRESSURE: 90 MMHG | OXYGEN SATURATION: 96 % | HEART RATE: 102 BPM

## 2020-04-07 VITALS
RESPIRATION RATE: 18 BRPM | WEIGHT: 184.97 LBS | SYSTOLIC BLOOD PRESSURE: 120 MMHG | TEMPERATURE: 98 F | OXYGEN SATURATION: 98 % | DIASTOLIC BLOOD PRESSURE: 75 MMHG | HEIGHT: 71 IN | HEART RATE: 98 BPM

## 2020-04-07 DIAGNOSIS — Z98.89 OTHER SPECIFIED POSTPROCEDURAL STATES: Chronic | ICD-10-CM

## 2020-04-07 DIAGNOSIS — R41.82 ALTERED MENTAL STATUS, UNSPECIFIED: ICD-10-CM

## 2020-04-07 DIAGNOSIS — F10.129 ALCOHOL ABUSE WITH INTOXICATION, UNSPECIFIED: ICD-10-CM

## 2020-04-07 PROCEDURE — 99284 EMERGENCY DEPT VISIT MOD MDM: CPT

## 2020-04-07 NOTE — ED PROVIDER NOTE - OBJECTIVE STATEMENT
53 yo M w/ history of alcohol abuse BIBA for public EtOH intox. Pt admits to drinking alcohol today. Denies drug use or other illicits. No acute medical complaints or apparent trauma noted.  pt arrives with his own crutches. No bleeding, respiratory distress, pain, vomiting, or urinary/bowel incontinence noted. 53 yo M w/ history of alcohol abuse BIBA for public EtOH intox. Pt admits to drinking alcohol today. Denies drug use or other illicits. No acute medical complaints or apparent trauma noted.  pt arrives with his own crutches. No bleeding, respiratory distress, pain, vomiting, or urinary/bowel incontinence noted.  labs from 2 days ago reviewed. no acute findings. cth and ctchest w/o acute findings. utox positive, etoh level positive

## 2020-04-07 NOTE — ED ADULT TRIAGE NOTE - CHIEF COMPLAINT QUOTE
brought in by security for [facility]. pt admits to drinking vodka all day. as per pt, "I am the most famous alcoholic in [place]". pt arrives with his own crutches, no obvious signs of injury/trauma noted.

## 2020-04-07 NOTE — ED PROVIDER NOTE - PHYSICAL EXAMINATION
General: arousable to touch, smells of alcohol  Head: NCAT  Eyes: PERRL  Heart: RRR  Lungs: CTAB  Abd: soft, NTND  Neuro: moves all 4 extremities equally  Skin: no e/o lacerations, abrasions, or ecchymoses

## 2020-04-07 NOTE — ED PROVIDER NOTE - PATIENT PORTAL LINK FT
You can access the FollowMyHealth Patient Portal offered by [facility] by registering at the following website: [email or web address]. By joining [employer]’s FollowMyHealth portal, you will also be able to view your health information using other applications (apps) compatible with our system.
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
## 2020-04-07 NOTE — ED PROVIDER NOTE - PROGRESS NOTE DETAILS
pt tolerating PO, still unsteady on feet. will allow pt to rest, reassess pt tolerated PO again, able to stand to urinate steadily. no longer w/ slurred speech. stable for d/c pt tolerated PO again, able to stand and walk steadily. no longer w/ slurred speech. stable for d/c. no tremulousness on exam.

## 2020-04-08 DIAGNOSIS — R05 COUGH: ICD-10-CM

## 2020-04-08 DIAGNOSIS — F10.129 ALCOHOL ABUSE WITH INTOXICATION, UNSPECIFIED: ICD-10-CM

## 2020-04-08 DIAGNOSIS — R00.0 TACHYCARDIA, UNSPECIFIED: ICD-10-CM

## 2020-04-08 DIAGNOSIS — R41.82 ALTERED MENTAL STATUS, UNSPECIFIED: ICD-10-CM

## 2020-04-09 ENCOUNTER — EMERGENCY (EMERGENCY)
Facility: HOSPITAL | Age: 55
LOS: 1 days | Discharge: HOME | End: 2020-04-09
Attending: EMERGENCY MEDICINE | Admitting: EMERGENCY MEDICINE
Payer: MEDICAID

## 2020-04-09 VITALS
TEMPERATURE: 98 F | SYSTOLIC BLOOD PRESSURE: 148 MMHG | HEIGHT: 71 IN | DIASTOLIC BLOOD PRESSURE: 80 MMHG | HEART RATE: 81 BPM | RESPIRATION RATE: 20 BRPM | OXYGEN SATURATION: 96 %

## 2020-04-09 DIAGNOSIS — Z98.89 OTHER SPECIFIED POSTPROCEDURAL STATES: Chronic | ICD-10-CM

## 2020-04-09 PROCEDURE — 99283 EMERGENCY DEPT VISIT LOW MDM: CPT

## 2020-04-09 NOTE — ED PROVIDER NOTE - OBJECTIVE STATEMENT
54 male pt, known to our ED for alcohol and drug abuse. Also hx of aggressive behavior in ED. Several visits this week for similar. BIBEMS for AMS. STates [place]. no trauma, or any medical complains.
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
## 2020-04-09 NOTE — ED ADULT TRIAGE NOTE - CHIEF COMPLAINT QUOTE
pt admits to drinking ETOH. Spitting at EMS stating "I have covid." Known to ED for being aggressive.

## 2020-04-09 NOTE — ED PROVIDER NOTE - PATIENT PORTAL LINK FT
You can access the FollowMyHealth Patient Portal offered by [facility] by registering at the following website: [email or web address]. By joining [employer]’s FollowMyHealth portal, you will also be able to view your health information using other applications (apps) compatible with our system.

## 2020-04-10 PROBLEM — Z78.9 OTHER SPECIFIED HEALTH STATUS: Chronic | Status: ACTIVE | Noted: 2020-04-05

## 2020-04-13 DIAGNOSIS — R41.82 ALTERED MENTAL STATUS, UNSPECIFIED: ICD-10-CM

## 2020-04-13 DIAGNOSIS — F10.10 ALCOHOL ABUSE, UNCOMPLICATED: ICD-10-CM

## 2020-06-15 NOTE — ED ADULT NURSE NOTE - LEARNING READINESS
Verified patient's first and last name, and . Patient stated reason for visit today is to receive mantoux for school purposes.     The patient is asked the following questions today and these are her answers:    -Have you had a mantoux administered in the past 30 days?    No  -Have you had a previous positive Mantoux.  No  -Have you received BCG in the past.  No  -Have you had a live vaccine  (MMR, Varicella, OPV, Yellow Fever) in the last 6 weeks.  No  -Have you had and active  viral or bacterial infection in the past 6 weeks.  No  -Have you received corticosteroids or immunosuppressive agents in the past 6 weeks.  No  -Have you been diagnosed with HIV?  No  -Do you have a malignancy?  No    Mantoux Questionnaire: answers were all negative. Mantoux given. Patient to return in 48-72 hours for mantoux reading.       Dayna AVERYN, RN on 6/15/2020 at 3:07 PM         Yes

## 2020-10-23 NOTE — ED ADULT NURSE NOTE - CAS TRG GEN SKIN CONDITION
FREE:[LAST:[[facility]],PHONE:[phone]],FAX:[(   [phone]    -],ADDRESS:[[address] [place] [address]]]
Warm/Dry

## 2021-02-21 ENCOUNTER — EMERGENCY (EMERGENCY)
Facility: HOSPITAL | Age: 56
LOS: 1 days | Discharge: HOME | End: 2021-02-21
Admitting: EMERGENCY MEDICINE
Payer: MEDICAID

## 2021-02-21 VITALS
RESPIRATION RATE: 15 BRPM | TEMPERATURE: 98 F | DIASTOLIC BLOOD PRESSURE: 81 MMHG | WEIGHT: 179.9 LBS | HEIGHT: 71 IN | HEART RATE: 84 BPM | SYSTOLIC BLOOD PRESSURE: 134 MMHG | OXYGEN SATURATION: 98 %

## 2021-02-21 DIAGNOSIS — Z59.0 HOMELESSNESS: ICD-10-CM

## 2021-02-21 DIAGNOSIS — Z98.89 OTHER SPECIFIED POSTPROCEDURAL STATES: Chronic | ICD-10-CM

## 2021-02-21 DIAGNOSIS — Z88.0 ALLERGY STATUS TO PENICILLIN: ICD-10-CM

## 2021-02-21 PROCEDURE — 99283 EMERGENCY DEPT VISIT LOW MDM: CPT

## 2021-02-21 SDOH — ECONOMIC STABILITY - HOUSING INSECURITY: HOMELESSNESS: Z59.0
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
## 2021-02-22 VITALS
OXYGEN SATURATION: 96 % | SYSTOLIC BLOOD PRESSURE: 133 MMHG | HEART RATE: 77 BPM | DIASTOLIC BLOOD PRESSURE: 79 MMHG | RESPIRATION RATE: 18 BRPM

## 2021-02-22 LAB — GLUCOSE BLDC GLUCOMTR-MCNC: 77 MG/DL — SIGNIFICANT CHANGE UP (ref 70–99)

## 2021-02-22 RX ORDER — FAMOTIDINE 10 MG/ML
20 INJECTION INTRAVENOUS ONCE
Refills: 0 | Status: DISCONTINUED | OUTPATIENT
Start: 2021-02-22 | End: 2021-02-25

## 2021-02-22 RX ADMIN — FAMOTIDINE 20 MILLIGRAM(S): 10 INJECTION INTRAVENOUS at 05:58
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
## 2021-02-22 NOTE — ED PROVIDER NOTE - NSFOLLOWUPINSTRUCTIONS_ED_ALL_ED_FT
PLEASE FOLLOW-UP WITH YOUR PRIMARY CARE DOCTOR IN 1-3 DAYS FOR FURTHER EVALUATION.  PLEASE TAKE ALL PAPERWORK FROM TODAY'S VISIT TO YOUR PRIMARY DOCTOR.  IF YOU DO NOT HAVE A PRIMARY CARE DOCTOR PLEASE REFER TO THE OFFICE/CLINIC INFORMATION GIVEN ABOVE.  YOU MAY ALSO CALL [phone] AND ASK FOR MS. TORI GEIGER.  SHE CAN HELP YOU MAKE A FOLLOW-UP APPOINTMENT.  HER HOURS ARE 11AM-7PM MONDAY - FRIDAY.    PLEASE RETURN TO THE ER IMMEDIATELY OR CALL 911 FOR ANY HIGH FEVER, CHEST PAIN, TROUBLE BREATHING, VOMITING, SEVERE PAIN, OR ANY OTHER CONCERNS

## 2021-02-22 NOTE — ED PROVIDER NOTE - CLINICAL SUMMARY MEDICAL DECISION MAKING FREE TEXT BOX
Pt A&Ox3. NAD. Calm and cooperative. Clear speech, baseline gait with no complaints at this time. Will D/C. Pt rested comfortably in ED and is now requesting to be discharged. A&Ox3. NAD. Calm and cooperative. Clear speech, baseline gait with no complaints at this time. Will D/C.

## 2021-02-22 NOTE — ED ADULT NURSE NOTE - NS ED PATIENT SAFETY CONCERN
Progress Notes by Karine Clarke RN at 08/10/18 04:40 PM     Author:  Karine Clarke RN Service:  (none) Author Type:  Registered Nurse     Filed:  08/10/18 04:40 PM Encounter Date:  8/10/2018 Status:  Signed     :  Karine Clarke RN (Registered Nurse)              We have recommended to patient/parent/guardian that they should wait 15 minutes after receiving an injection. Patient received Vaccine Information Sheet(s) for vaccine(s) administered.     Electronically Signed by:    Karine Clarke RN , 8/10/2018[VM1.1T]      Revision History        User Key Date/Time User Provider Type Action    > VM1.1 08/10/18 04:40 PM Karine Clarke RN Registered Nurse Sign    T - Template             Unable to assess due to medical condition
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
## 2021-02-22 NOTE — ED PROVIDER NOTE - PATIENT PORTAL LINK FT
You can access the FollowMyHealth Patient Portal offered by Health system by registering at the following website: [email or web address]. By joining [employer]’s FollowMyHealth portal, you will also be able to view your health information using other applications (apps) compatible with our system.
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
## 2021-07-07 NOTE — ED ADULT NURSE NOTE - PATIENT IS UNABLE TO BE SCREENED DUE TO:
Pt called back, informed of lab results.  Also wanted me to inform Dr. Najera that he had a colonoscopy in 2017, results were normal and records have been requested and sent to Dr. Najera.   Acuity of illness

## 2021-07-08 NOTE — ED PROVIDER NOTE - CPE EDP GASTRO NORM
normal... Opioid Counseling: I discussed with the patient the potential side effects of opioids including but not limited to addiction, altered mental status, and depression. I stressed avoiding alcohol, benzodiazepines, muscle relaxants and sleep aids unless specifically okayed by a physician. The patient verbalized understanding of the proper use and possible adverse effects of opioids. All of the patient's questions and concerns were addressed. They were instructed to flush the remaining pills down the toilet if they did not need them for pain.

## 2021-10-13 ENCOUNTER — EMERGENCY (EMERGENCY)
Facility: HOSPITAL | Age: 56
LOS: 1 days | Discharge: LEFT BEFORE DISCHARGE | End: 2021-10-13
Admitting: EMERGENCY MEDICINE
Payer: MEDICAID

## 2021-10-13 VITALS
DIASTOLIC BLOOD PRESSURE: 95 MMHG | RESPIRATION RATE: 18 BRPM | HEIGHT: 70 IN | SYSTOLIC BLOOD PRESSURE: 155 MMHG | WEIGHT: 179.9 LBS | OXYGEN SATURATION: 94 % | HEART RATE: 88 BPM | TEMPERATURE: 98 F

## 2021-10-13 DIAGNOSIS — Z98.89 OTHER SPECIFIED POSTPROCEDURAL STATES: Chronic | ICD-10-CM

## 2021-10-13 DIAGNOSIS — Z53.21 PROCEDURE AND TREATMENT NOT CARRIED OUT DUE TO PATIENT LEAVING PRIOR TO BEING SEEN BY HEALTH CARE PROVIDER: ICD-10-CM

## 2021-10-13 DIAGNOSIS — R41.82 ALTERED MENTAL STATUS, UNSPECIFIED: ICD-10-CM

## 2021-10-13 PROCEDURE — L9991: CPT

## 2021-10-13 NOTE — ED ADULT TRIAGE NOTE - CHIEF COMPLAINT QUOTE
BIBA after [employer] called [place] +pt yelling in triage, uncooperative

## 2021-10-15 ENCOUNTER — EMERGENCY (EMERGENCY)
Facility: HOSPITAL | Age: 56
LOS: 1 days | Discharge: HOME | End: 2021-10-15
Admitting: EMERGENCY MEDICINE
Payer: MEDICAID

## 2021-10-15 VITALS
RESPIRATION RATE: 18 BRPM | SYSTOLIC BLOOD PRESSURE: 145 MMHG | HEART RATE: 88 BPM | HEIGHT: 70 IN | WEIGHT: 149.91 LBS | TEMPERATURE: 98 F | DIASTOLIC BLOOD PRESSURE: 94 MMHG | OXYGEN SATURATION: 93 %

## 2021-10-15 DIAGNOSIS — F10.129 ALCOHOL ABUSE WITH INTOXICATION, UNSPECIFIED: ICD-10-CM

## 2021-10-15 DIAGNOSIS — Z88.0 ALLERGY STATUS TO PENICILLIN: ICD-10-CM

## 2021-10-15 DIAGNOSIS — Z87.19 PERSONAL HISTORY OF OTHER DISEASES OF THE DIGESTIVE SYSTEM: ICD-10-CM
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
## 2021-10-15 DIAGNOSIS — Z98.89 OTHER SPECIFIED POSTPROCEDURAL STATES: Chronic | ICD-10-CM

## 2021-10-15 DIAGNOSIS — M19.90 UNSPECIFIED OSTEOARTHRITIS, UNSPECIFIED SITE: ICD-10-CM

## 2021-10-15 DIAGNOSIS — F31.9 BIPOLAR DISORDER, UNSPECIFIED: ICD-10-CM

## 2021-10-15 DIAGNOSIS — F25.9 SCHIZOAFFECTIVE DISORDER, UNSPECIFIED: ICD-10-CM

## 2021-10-15 DIAGNOSIS — Z87.898 PERSONAL HISTORY OF OTHER SPECIFIED CONDITIONS: ICD-10-CM

## 2021-10-15 PROCEDURE — 99284 EMERGENCY DEPT VISIT MOD MDM: CPT

## 2021-10-15 RX ORDER — ONDANSETRON 8 MG/1
4 TABLET, FILM COATED ORAL ONCE
Refills: 0 | Status: COMPLETED | OUTPATIENT
Start: 2021-10-15 | End: 2021-10-15

## 2021-10-15 NOTE — ED ADULT NURSE NOTE - OBJECTIVE STATEMENT
responsive to verbal, opens eyes to answer brief questions then quickly falls back asleep. Even rise and fall of chest.
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
## 2021-10-15 NOTE — ED ADULT TRIAGE NOTE - BMI (KG/M2)
21.5 (REGLAN) 5 MG tablet Take 1 tablet by mouth 2 times daily 60 tablet 3    sertraline (ZOLOFT) 100 MG tablet Take 1 tablet by mouth daily 30 tablet 3    aspirin 81 MG tablet Take by mouth daily      Mirabegron ER (MYRBETRIQ) 50 MG TB24 Take 50 mg by mouth daily 30 tablet 11    losartan-hydrochlorothiazide (HYZAAR) 50-12.5 MG per tablet take 1 tablet by mouth once daily 90 tablet 2    FOLIC ACID PO Take by mouth      levothyroxine (SYNTHROID) 88 MCG tablet Take 1 tablet by mouth daily 30 tablet 7    insulin aspart (NOVOLOG) 100 UNIT/ML injection vial USE AS DIRECTED PER  THE [facility] INSULIN PUMP  SAMPLES GIVEN    LOT [id number]  EXP 5/2018 1 vial 1    HYDROcodone-acetaminophen (NORCO) 5-325 MG per tablet Take 1 tablet by mouth every 6 hours as needed  .  cyclobenzaprine (FLEXERIL) 5 MG tablet Take 5 mg by mouth 3 times daily as needed for Muscle spasms      isosorbide mononitrate (IMDUR) 60 MG CR tablet 1 tablet daily      gabapentin (NEURONTIN) 400 MG capsule Take 400 mg by mouth 3 times daily      metoprolol tartrate (LOPRESSOR) 25 MG tablet 1 tablet 2 times daily      atorvastatin (LIPITOR) 40 MG tablet Take 40 mg by mouth daily      Omega-3 Fatty Acids (FISH OIL) 1000 MG CPDR Take  by mouth. No current facility-administered medications for this visit. Allergies   Allergen Reactions    Actos [Pioglitazone] Other (See Comments)     Weight gain    Lisinopril Other (See Comments)     cough    Metformin And Related Diarrhea    Zithromax [Azithromycin] Itching         Subjective:      Review of Systems AS NOTED IN HPI      Objective:      Physical Exam     PATIENT IS OBESE. NO DISTRESS. Reviewed Vitals  Eyes: BHAVANI  Neck: NO MASSES. NO ADENOPATHY. THYROID NOT ENLARGED. Cardiovascular:  HEART REGULAR, NO MURMUR. NO CAROTID BRUIT  Respiratory:  BREATHING COMFORTABLY. LUNGS CLEAR. NO WHEEZES  Abdomen:  SOFT. MILD UNEASINESS FELT LOWER ABDOMEN ON DEEP PALPATION.   LIVER AND SPLEEN NOT PALPABLE. NO OTHER MASSES FELT. Extremities: NO EDEMA. NO CALF TENDERNESS. Peripheral Vascular:  PEDAL PULSUS GOOD  Neurological:   MONO FILAMENT SENSATIONS DIMINISHED BOTH FEET. REFLEXES NORMAL BOTH SIDES. Assessment:   LAB  FROM 12/5/17    TSH 2.82  BUN 19, CREAT 0.70, LYTES 139/3.8, CA 9.7, GLU  188, ALB 4.1, ALT 13  CHOL 150, TRIG 66, HDL 48, LDL 89  MICROALB 24 MG/G  UA 23 WBC  CBC  HGB 13.2, WBC 83, PLAT 177  A1C 8.8    IMPRESSION :     TYPE I DIABETES ON INSULIN PUMP. A1C 21/5/17  8.8  HYPERTENSION  HYPERCHOLESTEROLEMIA  HX OF GASTROPARESIS  AND ACID REFLUX ON PRILOSEC AND REGLAN. SYMPTOMS OF ACID REFLUX WORSE FOR LAST 3 WEEKS  HYPOTHYROIDISM  S/P THYROID NODULE RESECTION BENIGN. ON LEVOTHYROXINE    HX OF RETINOPATHY AND NEUROPATHY  OBEITY     OTHER PROBLEMS  :     CHRONIC LOW BACK PAIN  DEPRESSION  DEG ARTHRITIS KNEES    No diagnosis found. Plan:      ALL LAB DISCUSSED . CONTINUE PRESENT INSULIN PUMP PRESENT BASAL AND BOLUS SETTINGS. DISCUSSED. CARB RATIO  8 GM AT BREAKFAST, LUNCH AND SUPPER. MID NIGHT 9 GM. , CONTINUE PRESENT BASAL SETTINGS 12 MID NIGHT 1.4 U/HR, 8 A.M 1.7 U/HR, 10 P.M 1.6 U/HR. SENSITIVITY 40 TARGET        ADD CARAFATE 1 GM TID AND HS FOR ACID REFLUX. IF SYMPTOMS CONTINUE TO SEE G.I    KEEP HEAD OF BED ELEVATED     CONTINUE ALL OTHER MEDS AS REVIEWED AND DISCUSSED.       WATCH DIET AND EXERCISE AS TOLERATED.     RETURN 3  MONTHS. No orders of the defined types were placed in this encounter. No orders of the defined types were placed in this encounter. No Follow-up on file. Patient given educational materials - see patient instructions. Discussed use, benefit, and side effects of prescribed medications. All patient questions answered. Pt voiced understanding. Reviewed health maintenance. Instructed to continue current medications, diet and exercise. Patient agreed with treatment plan. Follow up as directed.        Electronically signed by

## 2021-10-15 NOTE — ED PROVIDER NOTE - PATIENT PORTAL LINK FT
You can access the FollowMyHealth Patient Portal offered by [facility] by registering at the following website: [email or web address]. By joining [facility]’s FollowMyHealth portal, you will also be able to view your health information using other applications (apps) compatible with our system.

## 2021-10-15 NOTE — ED ADULT NURSE NOTE - NSIMPLEMENTINTERV_GEN_ALL_ED
Implemented All Fall Risk Interventions:  [place] to call system. Call bell, personal items and telephone within reach. Instruct patient to call for assistance. Room bathroom lighting operational. Non-slip footwear when patient is off stretcher. Physically safe environment: no spills, clutter or unnecessary equipment. Stretcher in lowest position, wheels locked, appropriate side rails in place. Provide visual cue, wrist band, yellow gown, etc. Monitor gait and stability. Monitor for mental status changes and reorient to person, place, and time. Review medications for side effects contributing to fall risk. Reinforce activity limits and safety measures with patient and family.

## 2021-10-16 VITALS
HEART RATE: 94 BPM | SYSTOLIC BLOOD PRESSURE: 126 MMHG | DIASTOLIC BLOOD PRESSURE: 76 MMHG | RESPIRATION RATE: 18 BRPM | TEMPERATURE: 98 F | OXYGEN SATURATION: 95 %

## 2022-02-17 ENCOUNTER — HOSPITAL ENCOUNTER (INPATIENT)
Dept: HOSPITAL 74 - YASAS | Age: 57
LOS: 6 days | Discharge: ANOTHER HOSPITAL | End: 2022-02-23
Attending: ALLERGY & IMMUNOLOGY | Admitting: ALLERGY & IMMUNOLOGY
Payer: COMMERCIAL

## 2022-02-17 VITALS — BODY MASS INDEX: 25.1 KG/M2

## 2022-02-17 DIAGNOSIS — E87.6: ICD-10-CM

## 2022-02-17 DIAGNOSIS — K21.9: ICD-10-CM

## 2022-02-17 DIAGNOSIS — Z59.01: ICD-10-CM

## 2022-02-17 DIAGNOSIS — R74.01: ICD-10-CM

## 2022-02-17 DIAGNOSIS — Z86.19: ICD-10-CM

## 2022-02-17 DIAGNOSIS — R45.851: ICD-10-CM

## 2022-02-17 DIAGNOSIS — F10.230: Primary | ICD-10-CM

## 2022-02-17 DIAGNOSIS — G47.00: ICD-10-CM

## 2022-02-17 DIAGNOSIS — Z56.0: ICD-10-CM

## 2022-02-17 DIAGNOSIS — Z99.89: ICD-10-CM

## 2022-02-17 DIAGNOSIS — F25.9: ICD-10-CM

## 2022-02-17 DIAGNOSIS — Z87.828: ICD-10-CM

## 2022-02-17 DIAGNOSIS — H40.9: ICD-10-CM

## 2022-02-17 DIAGNOSIS — Z88.0: ICD-10-CM

## 2022-02-17 DIAGNOSIS — F12.20: ICD-10-CM

## 2022-02-17 DIAGNOSIS — F17.210: ICD-10-CM

## 2022-02-17 PROCEDURE — C9803 HOPD COVID-19 SPEC COLLECT: HCPCS

## 2022-02-17 PROCEDURE — U0005 INFEC AGEN DETEC AMPLI PROBE: HCPCS

## 2022-02-17 PROCEDURE — U0003 INFECTIOUS AGENT DETECTION BY NUCLEIC ACID (DNA OR RNA); SEVERE ACUTE RESPIRATORY SYNDROME CORONAVIRUS 2 (SARS-COV-2) (CORONAVIRUS DISEASE [COVID-19]), AMPLIFIED PROBE TECHNIQUE, MAKING USE OF HIGH THROUGHPUT TECHNOLOGIES AS DESCRIBED BY CMS-2020-01-R: HCPCS

## 2022-02-17 PROCEDURE — HZ2ZZZZ DETOXIFICATION SERVICES FOR SUBSTANCE ABUSE TREATMENT: ICD-10-PCS | Performed by: ALLERGY & IMMUNOLOGY

## 2022-02-17 SDOH — ECONOMIC STABILITY - HOUSING INSECURITY: SHELTERED HOMELESSNESS: Z59.01

## 2022-02-17 SDOH — ECONOMIC STABILITY - INCOME SECURITY: UNEMPLOYMENT, UNSPECIFIED: Z56.0

## 2022-02-18 LAB
ALBUMIN SERPL-MCNC: 3.8 G/DL (ref 3.4–5)
ALP SERPL-CCNC: 92 U/L (ref 45–117)
ALT SERPL-CCNC: 47 U/L (ref 13–61)
ANION GAP SERPL CALC-SCNC: 10 MMOL/L (ref 8–16)
AST SERPL-CCNC: 56 U/L (ref 15–37)
BILIRUB SERPL-MCNC: 1 MG/DL (ref 0.2–1)
BUN SERPL-MCNC: 15.1 MG/DL (ref 7–18)
CALCIUM SERPL-MCNC: 9.4 MG/DL (ref 8.5–10.1)
CHLORIDE SERPL-SCNC: 99 MMOL/L (ref 98–107)
CO2 SERPL-SCNC: 27 MMOL/L (ref 21–32)
CREAT SERPL-MCNC: 0.8 MG/DL (ref 0.55–1.3)
DEPRECATED RDW RBC AUTO: 14.5 % (ref 11.9–15.9)
GLUCOSE SERPL-MCNC: 95 MG/DL (ref 74–106)
HCT VFR BLD CALC: 40.1 % (ref 35.4–49)
HGB BLD-MCNC: 13.4 GM/DL (ref 11.7–16.9)
MCH RBC QN AUTO: 32.3 PG (ref 25.7–33.7)
MCHC RBC AUTO-ENTMCNC: 33.5 G/DL (ref 32–35.9)
MCV RBC: 96.4 FL (ref 80–96)
PLATELET # BLD AUTO: 179 10^3/UL (ref 134–434)
PMV BLD: 8.8 FL (ref 7.5–11.1)
PROT SERPL-MCNC: 7.8 G/DL (ref 6.4–8.2)
RBC # BLD AUTO: 4.16 M/MM3 (ref 4–5.6)
SODIUM SERPL-SCNC: 136 MMOL/L (ref 136–145)
WBC # BLD AUTO: 4.7 K/MM3 (ref 4–10)

## 2022-02-18 RX ADMIN — BISMUTH SUBSALICYLATE PRN MG: 525 LIQUID ORAL at 15:15

## 2022-02-18 RX ADMIN — TRAZODONE HYDROCHLORIDE SCH MG: 50 TABLET ORAL at 22:13

## 2022-02-18 RX ADMIN — Medication SCH: at 11:01

## 2022-02-18 RX ADMIN — FAMOTIDINE SCH: 20 TABLET ORAL at 11:01

## 2022-02-18 RX ADMIN — BISMUTH SUBSALICYLATE PRN MG: 525 LIQUID ORAL at 20:08

## 2022-02-18 RX ADMIN — Medication SCH MG: at 22:14

## 2022-02-18 RX ADMIN — Medication SCH: at 02:05

## 2022-02-18 RX ADMIN — DIVALPROEX SODIUM SCH MG: 500 TABLET, DELAYED RELEASE ORAL at 22:13

## 2022-02-18 RX ADMIN — POTASSIUM CHLORIDE SCH MEQ: 1500 TABLET, EXTENDED RELEASE ORAL at 18:16

## 2022-02-18 RX ADMIN — FAMOTIDINE SCH: 20 TABLET ORAL at 02:05

## 2022-02-19 RX ADMIN — DIVALPROEX SODIUM SCH MG: 500 TABLET, DELAYED RELEASE ORAL at 22:52

## 2022-02-19 RX ADMIN — POTASSIUM CHLORIDE SCH MEQ: 1500 TABLET, EXTENDED RELEASE ORAL at 18:31

## 2022-02-19 RX ADMIN — Medication SCH MG: at 22:52

## 2022-02-19 RX ADMIN — HYDROXYZINE PAMOATE PRN MG: 25 CAPSULE ORAL at 22:52

## 2022-02-19 RX ADMIN — DIVALPROEX SODIUM SCH MG: 500 TABLET, DELAYED RELEASE ORAL at 11:40

## 2022-02-19 RX ADMIN — METHOCARBAMOL PRN MG: 500 TABLET ORAL at 05:41

## 2022-02-19 RX ADMIN — Medication PRN MG: at 22:52

## 2022-02-19 RX ADMIN — FAMOTIDINE SCH MG: 20 TABLET ORAL at 11:40

## 2022-02-19 RX ADMIN — TRAZODONE HYDROCHLORIDE SCH MG: 50 TABLET ORAL at 22:52

## 2022-02-19 RX ADMIN — POTASSIUM CHLORIDE SCH MEQ: 1500 TABLET, EXTENDED RELEASE ORAL at 05:41

## 2022-02-19 RX ADMIN — Medication SCH TAB: at 11:39

## 2022-02-20 RX ADMIN — FAMOTIDINE SCH MG: 20 TABLET ORAL at 10:57

## 2022-02-20 RX ADMIN — BISMUTH SUBSALICYLATE PRN MG: 525 LIQUID ORAL at 19:17

## 2022-02-20 RX ADMIN — BISMUTH SUBSALICYLATE PRN MG: 525 LIQUID ORAL at 20:34

## 2022-02-20 RX ADMIN — HYDROXYZINE PAMOATE PRN MG: 25 CAPSULE ORAL at 22:24

## 2022-02-20 RX ADMIN — DIVALPROEX SODIUM SCH MG: 500 TABLET, DELAYED RELEASE ORAL at 10:57

## 2022-02-20 RX ADMIN — DIVALPROEX SODIUM SCH MG: 500 TABLET, DELAYED RELEASE ORAL at 22:24

## 2022-02-20 RX ADMIN — Medication SCH TAB: at 10:57

## 2022-02-20 RX ADMIN — BISMUTH SUBSALICYLATE PRN MG: 525 LIQUID ORAL at 22:26

## 2022-02-20 RX ADMIN — Medication SCH MG: at 22:24

## 2022-02-20 RX ADMIN — TRAZODONE HYDROCHLORIDE SCH MG: 50 TABLET ORAL at 22:24

## 2022-02-21 RX ADMIN — DIVALPROEX SODIUM SCH MG: 500 TABLET, DELAYED RELEASE ORAL at 22:30

## 2022-02-21 RX ADMIN — ONDANSETRON PRN MG: 4 TABLET, ORALLY DISINTEGRATING ORAL at 11:02

## 2022-02-21 RX ADMIN — TRAZODONE HYDROCHLORIDE SCH MG: 50 TABLET ORAL at 22:30

## 2022-02-21 RX ADMIN — ACETAMINOPHEN PRN MG: 325 TABLET ORAL at 17:33

## 2022-02-21 RX ADMIN — FAMOTIDINE SCH MG: 20 TABLET ORAL at 11:01

## 2022-02-21 RX ADMIN — Medication SCH MG: at 22:30

## 2022-02-21 RX ADMIN — DIVALPROEX SODIUM SCH MG: 500 TABLET, DELAYED RELEASE ORAL at 11:01

## 2022-02-21 RX ADMIN — Medication SCH TAB: at 11:01

## 2022-02-21 RX ADMIN — METHOCARBAMOL PRN MG: 500 TABLET ORAL at 22:31

## 2022-02-22 RX ADMIN — DIVALPROEX SODIUM SCH MG: 500 TABLET, DELAYED RELEASE ORAL at 22:29

## 2022-02-22 RX ADMIN — DIVALPROEX SODIUM SCH: 500 TABLET, DELAYED RELEASE ORAL at 10:15

## 2022-02-22 RX ADMIN — ACETAMINOPHEN PRN MG: 325 TABLET ORAL at 22:32

## 2022-02-22 RX ADMIN — FAMOTIDINE SCH: 20 TABLET ORAL at 10:15

## 2022-02-22 RX ADMIN — HYDROXYZINE PAMOATE PRN MG: 25 CAPSULE ORAL at 22:29

## 2022-02-22 RX ADMIN — ONDANSETRON PRN MG: 4 TABLET, ORALLY DISINTEGRATING ORAL at 06:25

## 2022-02-22 RX ADMIN — Medication PRN MG: at 22:28

## 2022-02-22 RX ADMIN — METHOCARBAMOL PRN MG: 500 TABLET ORAL at 22:29

## 2022-02-22 RX ADMIN — Medication SCH: at 10:15

## 2022-02-22 RX ADMIN — Medication SCH MG: at 22:29

## 2022-02-22 RX ADMIN — TRAZODONE HYDROCHLORIDE SCH MG: 50 TABLET ORAL at 22:29

## 2022-02-23 VITALS — DIASTOLIC BLOOD PRESSURE: 87 MMHG | HEART RATE: 73 BPM | SYSTOLIC BLOOD PRESSURE: 146 MMHG | TEMPERATURE: 98.4 F

## 2022-02-23 RX ADMIN — Medication SCH: at 11:48

## 2022-02-23 RX ADMIN — FAMOTIDINE SCH: 20 TABLET ORAL at 11:48

## 2022-02-23 RX ADMIN — DIVALPROEX SODIUM SCH: 500 TABLET, DELAYED RELEASE ORAL at 11:47
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
## 2022-08-08 NOTE — ED ADULT NURSE NOTE - SUICIDE SCREENING DEPRESSION
Incoming refill request for: Ambien     Per PDMP last dispensed on: 7/10/22    Last seen by: Klarissa Huff M.D. on: 7/10/22.  Future appointment to see PCP on: 8/10/2022    Active medication agreement updated on: 9/2/20    Last Drug Screen Collected on: 9/2/20    Script pended, with a start date of: today.    Criteria not met because UDS due.. Forwarded to Physician/COSTA for further review.   
Negative

## 2022-10-14 NOTE — ED ADULT NURSE NOTE - CHPI ED NUR SYMPTOMS POS
PSYCHO-ONCOLOGY NOTE/ Individual Psychotherapy     Date: 10/14/2022   Site:  [facility]        Therapeutic Intervention: Met with patient.  Outpatient - Behavior modifying psychotherapy 45 min - CPT code 40664  The patient's last visit with me was on 9/28/2022.    Problem list  Patient Active Problem List   Diagnosis    Hx of breast cancer    Spondylosis without myelopathy    Hamstring tightness of both lower extremities    Segmental and somatic dysfunction of lumbar region    Alteration in mobility associated with pain    Pelvic pain in female    Myalgia of pelvic floor    Nocturia more than twice per night    Lichen sclerosus et atrophicus    Irregular bowel habits    Hip pain, right    Chronic lower back pain    Urinary incontinence, urge    Pre-diabetes    Vaginal irritation    Postmenopausal bleeding    Endometrial cancer    History of robot-assisted laparoscopic hysterectomy    Encounter for antineoplastic chemotherapy    Thrombocytopenia due to drugs    Neutropenia, drug-induced    Anemia due to chronic blood loss    Radiation therapy complication    Major depressive disorder, recurrent episode, moderate with anxious distress    Bone pain    B12 deficiency    Radiation vaginitis    Allergic contact dermatitis due to photocontact other than sunburn, current    History of endometrial cancer    Chest pain    HERRING (dyspnea on exertion)    Liver cyst    Microscopic hematuria    Disorder of the skin, subcu related to radiation, unsp    Radiation necrosis of skin and subcutaneous    Disorder of the skin and subcutaneous tissue related to radiation, unspecified    GIB (gastrointestinal bleeding)    Macrocytic anemia    Atrial fibrillation with RVR    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    Peripheral neuropathy due to chemotherapy    Pain of right lower extremity    Recurrent UTI    Leg edema    Lumbar radiculopathy    Lumbar spondylosis    DDD (degenerative disc disease), lumbar     Right knee pain    Radiculopathy of leg    Abnormal MRI    Leukocytosis    Fever    Tachycardia    Paroxysmal atrial fibrillation    Cellulitis    Symptomatic anemia    Thrombocytopenia    Gastroesophageal reflux disease without esophagitis    Physical deconditioning    Stress and adjustment reaction    Myelodysplasia (myelodysplastic syndrome)       Chief complaint/reason for encounter: depression and anxiety   Met with patient to evaluate psychosocial adaptation to diagnosis/treatment/survivorship of endometrial cancer, anemia, myelodysplasia    Current Medications  Current Outpatient Medications   Medication    acetaminophen (TYLENOL) 500 MG tablet    ciprofloxacin-dexamethasone 0.3-0.1% (CIPRODEX) 0.3-0.1 % DrpS    clobetasol 0.05% (TEMOVATE) 0.05 % Oint    decitabine-cedazuridine  mg Tab    fexofenadine (ALLEGRA) 180 MG tablet    hydrocortisone 2.5 % cream    hydrOXYzine HCL (ATARAX) 25 MG tablet    meth/meblue/sod phos/psal/hyos (URIBEL ORAL)    metoprolol tartrate (LOPRESSOR) 25 MG tablet    mirtazapine (REMERON) 15 MG tablet    nystatin (MYCOSTATIN) cream    nystatin (MYCOSTATIN) ointment    nystatin (MYCOSTATIN) powder    ondansetron (ZOFRAN) 4 MG tablet    pantoprazole (PROTONIX) 40 MG tablet    triamcinolone acetonide 0.025% (KENALOG) 0.025 % Oint    UNABLE TO FIND    UNABLE TO FIND     No current facility-administered medications for this visit.       Objective:  Jeanne Rodgers arrived promptly for the session. Ms. Rodgers was independently ambulatory at the time of session. The patient was fully cooperative throughout the session.  Appearance: age appropriate, casually  dressed, well groomed  Behavior/Cooperation: friendly and cooperative  Speech: normal in rate, volume, and tone and appropriate quality, quantity and organization of sentences  Mood: dysthymic  Affect: dysphoric  Thought Process: goal-directed, logical  Thought Content: normal,  No delusions or paranoia; did not appear  to be responding to internal stimuli during the session  Orientation: grossly intact  Memory: Grossly intact  Attention Span/Concentration: Attends to session without distraction; reports no difficulty  Fund of Knowledge: average  Estimate of Intelligence: average from verbal skills and history  Cognition: grossly intact  Insight: patient has awareness of illness; good insight into own behavior and behavior of others  Judgment: the patient's behavior is adequate to circumstances    Interval history and content of current session: Patient discussed events and activities since the time of last visit. Discussed current adaptation to disease and treatment status. Reports to be coping with improvement since she now has a firm diagnosis. Discussed treatment plan with Dr. Rivera and feels comfortable with treatment and goals. Evaluated cognitive response, paying particular attention to negative intrusive thoughts of a persistent and detrimental nature. Thoughts of this type are in evidence with mild distress. Provided cognitive behavioral therapy to address negative cognitions. Identified and evaluated psychosocial and environmental stressors She discussed strain due to neighbor/ court case and tending to 5 stray kittens. Examined proactive behaviors that may be implemented to minimize or ameliorate psychosocial stressors. Patient has solid neighborhood support (and legal representation, and cameras for documentation).     Patient reports improved appetite and sleep (since THC back and Remeron).  Discussed fatigue management- doing well with OT/Yoga. Encouraged gradual increases in home movement to prevent additional deconditioning with new treatment.     Risk parameters:   Patient reports no suicidal ideation  Patient reports no homicidal ideation  Patient reports no self-injurious behavior  Patient reports no violent behavior   Safety needs:  None at this time      Verbal deficits: None     Patient's response to  intervention:The patient's response to intervention is accepting.     Progress toward goals: Progress as Expected        Patient reported outcomes:      Distress thermometer:   DISTRESS SCREENING 10/14/2022 10/6/2022 9/28/2022 9/12/2022 9/8/2022 8/29/2022 8/1/2022   Distress Score 6 0 - No Distress 5 0 - No Distress 0 - No Distress 4 0 - No Distress   Practical Problems - - - - - None of these -   Family Problems - - - - - None of these -   Emotional Problems - - - - - Depression;Fears;Nervousness;Sadness;Worry -   Spiritual / Yazidi Concerns - - - - - No -   Physical Problems - - - - - Breathing;Changes in urination;Diarrhea;Eating;Fatigue;Feeling Swollen;Memory/Concentration;Nausea;Pain;Skin Dry/Itchy;Sleep -   Other Problems - - - - - - -           PHQ-9=7; Initial visit: 17         SHEKHAR-7=5; Initial visit:16          Client Strengths: verbal, intelligent, successful, good social support, good insight, commitment to wellness      Treatment Plan:individual psychotherapy  Target symptoms: depression, adjustment  Why chosen therapy is appropriate versus another modality: relevant to diagnosis, patient responds to this modality, evidence based practice  Outcome monitoring methods: self-report, checklist/rating scale  Therapeutic intervention type: behavior modifying psychotherapy, supportive psychotherapy  Prognosis: Good    Goals from last visit: Met   Behavioral goals prior to next visit:    Exercise:  Continue yoga with Tamarin, increase home movement   Stress management: meditation class, coloring    Talk to [facility] and specialty pharmacy about different insurance plans if cost of new medication is challenging   Social engagement:   Nutrition:    Smoking Cessation:   Therapy: Work on finishing house (by December)    Return to clinic: as needed     Length of Service (minutes direct face-to-face contact): 45    Diagnosis:     ICD-10-CM ICD-9-CM   1. Major depressive disorder, recurrent episode, moderate with anxious  distress  F33.1 296.32         Bill Ayala, PhD  [place] License #[id number]  [place] License #[id number]             DISORIENTATION/CONFUSION

## 2023-03-16 ENCOUNTER — HOSPITAL ENCOUNTER (INPATIENT)
Dept: HOSPITAL 74 - YASAS | Age: 58
LOS: 4 days | Discharge: HOME | End: 2023-03-20
Attending: SURGERY | Admitting: ALLERGY & IMMUNOLOGY
Payer: COMMERCIAL

## 2023-03-16 VITALS — BODY MASS INDEX: 26.4 KG/M2

## 2023-03-16 DIAGNOSIS — F31.9: ICD-10-CM

## 2023-03-16 DIAGNOSIS — F19.24: ICD-10-CM

## 2023-03-16 DIAGNOSIS — F19.282: ICD-10-CM

## 2023-03-16 DIAGNOSIS — F10.230: Primary | ICD-10-CM

## 2023-03-16 DIAGNOSIS — F17.210: ICD-10-CM

## 2023-03-16 DIAGNOSIS — K21.9: ICD-10-CM

## 2023-03-16 DIAGNOSIS — F12.20: ICD-10-CM

## 2023-03-16 DIAGNOSIS — Z87.828: ICD-10-CM

## 2023-03-16 DIAGNOSIS — R79.89: ICD-10-CM

## 2023-03-16 DIAGNOSIS — Z59.01: ICD-10-CM

## 2023-03-16 DIAGNOSIS — Z96.653: ICD-10-CM

## 2023-03-16 DIAGNOSIS — F43.10: ICD-10-CM

## 2023-03-16 DIAGNOSIS — Z88.0: ICD-10-CM

## 2023-03-16 PROCEDURE — U0003 INFECTIOUS AGENT DETECTION BY NUCLEIC ACID (DNA OR RNA); SEVERE ACUTE RESPIRATORY SYNDROME CORONAVIRUS 2 (SARS-COV-2) (CORONAVIRUS DISEASE [COVID-19]), AMPLIFIED PROBE TECHNIQUE, MAKING USE OF HIGH THROUGHPUT TECHNOLOGIES AS DESCRIBED BY CMS-2020-01-R: HCPCS

## 2023-03-16 PROCEDURE — HZ2ZZZZ DETOXIFICATION SERVICES FOR SUBSTANCE ABUSE TREATMENT: ICD-10-PCS | Performed by: SURGERY

## 2023-03-16 PROCEDURE — U0005 INFEC AGEN DETEC AMPLI PROBE: HCPCS

## 2023-03-16 RX ADMIN — Medication SCH: at 15:23

## 2023-03-16 RX ADMIN — Medication SCH MG: at 22:53

## 2023-03-16 RX ADMIN — DIVALPROEX SODIUM SCH MG: 500 TABLET, DELAYED RELEASE ORAL at 22:54

## 2023-03-16 RX ADMIN — NICOTINE SCH: 7 PATCH TRANSDERMAL at 15:30

## 2023-03-16 SDOH — ECONOMIC STABILITY - HOUSING INSECURITY: SHELTERED HOMELESSNESS: Z59.01

## 2023-03-17 LAB
ALBUMIN SERPL-MCNC: 3.5 G/DL (ref 3.4–5)
ALP SERPL-CCNC: 66 U/L (ref 45–117)
ALT SERPL-CCNC: 50 U/L (ref 13–61)
ANION GAP SERPL CALC-SCNC: 5 MMOL/L (ref 8–16)
AST SERPL-CCNC: 46 U/L (ref 15–37)
BILIRUB SERPL-MCNC: 1.4 MG/DL (ref 0.2–1)
BUN SERPL-MCNC: 11.9 MG/DL (ref 7–18)
CALCIUM SERPL-MCNC: 8.7 MG/DL (ref 8.5–10.1)
CHLORIDE SERPL-SCNC: 107 MMOL/L (ref 98–107)
CO2 SERPL-SCNC: 29 MMOL/L (ref 21–32)
CREAT SERPL-MCNC: 0.7 MG/DL (ref 0.55–1.3)
DEPRECATED RDW RBC AUTO: 14.6 % (ref 11.9–15.9)
GLUCOSE SERPL-MCNC: 88 MG/DL (ref 74–106)
HCT VFR BLD CALC: 40.4 % (ref 35.4–49)
HGB BLD-MCNC: 13.7 GM/DL (ref 11.7–16.9)
MCH RBC QN AUTO: 32.7 PG (ref 25.7–33.7)
MCHC RBC AUTO-ENTMCNC: 33.9 G/DL (ref 32–35.9)
MCV RBC: 96.3 FL (ref 80–96)
PLATELET # BLD AUTO: 187 10^3/UL (ref 134–434)
PMV BLD: 9 FL (ref 7.5–11.1)
PROT SERPL-MCNC: 6.8 G/DL (ref 6.4–8.2)
RBC # BLD AUTO: 4.19 M/MM3 (ref 4–5.6)
SODIUM SERPL-SCNC: 141 MMOL/L (ref 136–145)
WBC # BLD AUTO: 3.4 K/MM3 (ref 4–10)

## 2023-03-17 RX ADMIN — FAMOTIDINE SCH MG: 10 TABLET, FILM COATED ORAL at 10:18

## 2023-03-17 RX ADMIN — NICOTINE SCH: 7 PATCH TRANSDERMAL at 10:18

## 2023-03-17 RX ADMIN — DIVALPROEX SODIUM SCH MG: 500 TABLET, DELAYED RELEASE ORAL at 10:19

## 2023-03-17 RX ADMIN — DIVALPROEX SODIUM SCH MG: 500 TABLET, DELAYED RELEASE ORAL at 22:04

## 2023-03-17 RX ADMIN — Medication SCH TAB: at 10:19

## 2023-03-17 RX ADMIN — Medication SCH MG: at 22:05

## 2023-03-18 LAB
AMYLASE SERPL-CCNC: 137 U/L (ref 25–115)
LIPASE SERPL-CCNC: 241 U/L (ref 73–393)

## 2023-03-18 RX ADMIN — Medication SCH TAB: at 10:34

## 2023-03-18 RX ADMIN — LACTULOSE SCH GM: 20 SOLUTION ORAL at 17:51

## 2023-03-18 RX ADMIN — DIVALPROEX SODIUM SCH MG: 500 TABLET, DELAYED RELEASE ORAL at 10:34

## 2023-03-18 RX ADMIN — DIVALPROEX SODIUM SCH MG: 500 TABLET, DELAYED RELEASE ORAL at 22:42

## 2023-03-18 RX ADMIN — Medication SCH MG: at 22:42

## 2023-03-18 RX ADMIN — LACTULOSE SCH GM: 20 SOLUTION ORAL at 22:42

## 2023-03-18 RX ADMIN — NICOTINE SCH MG: 7 PATCH TRANSDERMAL at 10:35

## 2023-03-18 RX ADMIN — LACTULOSE SCH GM: 20 SOLUTION ORAL at 13:22

## 2023-03-18 RX ADMIN — FAMOTIDINE SCH MG: 10 TABLET, FILM COATED ORAL at 10:34

## 2023-03-19 VITALS — RESPIRATION RATE: 18 BRPM

## 2023-03-19 RX ADMIN — Medication SCH TAB: at 10:04

## 2023-03-19 RX ADMIN — LACTULOSE SCH GM: 20 SOLUTION ORAL at 13:55

## 2023-03-19 RX ADMIN — DIVALPROEX SODIUM SCH MG: 500 TABLET, DELAYED RELEASE ORAL at 22:48

## 2023-03-19 RX ADMIN — HYDROXYZINE PAMOATE PRN MG: 25 CAPSULE ORAL at 13:57

## 2023-03-19 RX ADMIN — HYDROXYZINE PAMOATE PRN MG: 25 CAPSULE ORAL at 03:47

## 2023-03-19 RX ADMIN — NICOTINE SCH MG: 7 PATCH TRANSDERMAL at 10:04

## 2023-03-19 RX ADMIN — LACTULOSE SCH GM: 20 SOLUTION ORAL at 18:04

## 2023-03-19 RX ADMIN — Medication SCH MG: at 22:49

## 2023-03-19 RX ADMIN — DIVALPROEX SODIUM SCH MG: 500 TABLET, DELAYED RELEASE ORAL at 10:04

## 2023-03-19 RX ADMIN — FAMOTIDINE SCH MG: 10 TABLET, FILM COATED ORAL at 10:04

## 2023-03-19 RX ADMIN — LACTULOSE SCH GM: 20 SOLUTION ORAL at 10:05

## 2023-03-19 RX ADMIN — LACTULOSE SCH GM: 20 SOLUTION ORAL at 22:48

## 2023-03-20 VITALS — SYSTOLIC BLOOD PRESSURE: 150 MMHG | TEMPERATURE: 98 F | DIASTOLIC BLOOD PRESSURE: 87 MMHG | HEART RATE: 110 BPM

## 2023-03-20 RX ADMIN — NICOTINE SCH: 7 PATCH TRANSDERMAL at 09:26

## 2023-03-20 RX ADMIN — DIVALPROEX SODIUM SCH MG: 500 TABLET, DELAYED RELEASE ORAL at 09:24

## 2023-03-20 RX ADMIN — Medication SCH TAB: at 09:23

## 2023-03-20 RX ADMIN — LACTULOSE SCH GM: 20 SOLUTION ORAL at 09:25

## 2023-08-25 ENCOUNTER — HOSPITAL ENCOUNTER (INPATIENT)
Dept: HOSPITAL 74 - YASAS | Age: 58
LOS: 6 days | Discharge: HOME | End: 2023-08-31
Attending: ALLERGY & IMMUNOLOGY | Admitting: ALLERGY & IMMUNOLOGY
Payer: COMMERCIAL

## 2023-08-25 VITALS — BODY MASS INDEX: 25.1 KG/M2

## 2023-08-25 DIAGNOSIS — I10: ICD-10-CM

## 2023-08-25 DIAGNOSIS — Z86.59: ICD-10-CM

## 2023-08-25 DIAGNOSIS — F25.9: ICD-10-CM

## 2023-08-25 DIAGNOSIS — Z88.0: ICD-10-CM

## 2023-08-25 DIAGNOSIS — M17.0: ICD-10-CM

## 2023-08-25 DIAGNOSIS — F43.10: ICD-10-CM

## 2023-08-25 DIAGNOSIS — K21.9: ICD-10-CM

## 2023-08-25 DIAGNOSIS — F17.210: ICD-10-CM

## 2023-08-25 DIAGNOSIS — R26.89: ICD-10-CM

## 2023-08-25 DIAGNOSIS — F12.20: ICD-10-CM

## 2023-08-25 DIAGNOSIS — F10.230: Primary | ICD-10-CM

## 2023-08-25 PROCEDURE — HZ2ZZZZ DETOXIFICATION SERVICES FOR SUBSTANCE ABUSE TREATMENT: ICD-10-PCS | Performed by: SURGERY

## 2023-08-25 RX ADMIN — Medication SCH MG: at 23:05

## 2023-08-26 LAB
ALBUMIN SERPL-MCNC: 3.8 G/DL (ref 3.4–5)
ALP SERPL-CCNC: 65 U/L (ref 45–117)
ALT SERPL-CCNC: 28 U/L (ref 13–61)
ANION GAP SERPL CALC-SCNC: 9 MMOL/L (ref 8–16)
AST SERPL-CCNC: 29 U/L (ref 15–37)
BILIRUB SERPL-MCNC: 1.2 MG/DL (ref 0.2–1)
BUN SERPL-MCNC: 13 MG/DL (ref 7–18)
CALCIUM SERPL-MCNC: 9 MG/DL (ref 8.5–10.1)
CHLORIDE SERPL-SCNC: 107 MMOL/L (ref 98–107)
CO2 SERPL-SCNC: 22 MMOL/L (ref 21–32)
CREAT SERPL-MCNC: 0.8 MG/DL (ref 0.55–1.3)
DEPRECATED RDW RBC AUTO: 16.2 % (ref 11.9–15.9)
GLUCOSE SERPL-MCNC: 80 MG/DL (ref 74–106)
HCT VFR BLD CALC: 42.8 % (ref 35.4–49)
HGB BLD-MCNC: 14.1 GM/DL (ref 11.7–16.9)
MCH RBC QN AUTO: 30.9 PG (ref 25.7–33.7)
MCHC RBC AUTO-ENTMCNC: 32.9 G/DL (ref 32–35.9)
MCV RBC: 93.9 FL (ref 80–96)
PLATELET # BLD AUTO: 238 10^3/UL (ref 134–434)
PMV BLD: 8.9 FL (ref 7.5–11.1)
POTASSIUM SERPLBLD-SCNC: 4.3 MMOL/L (ref 3.5–5.1)
PROT SERPL-MCNC: 7.7 G/DL (ref 6.4–8.2)
RBC # BLD AUTO: 4.56 M/MM3 (ref 4–5.6)
SODIUM SERPL-SCNC: 138 MMOL/L (ref 136–145)
WBC # BLD AUTO: 5.4 K/MM3 (ref 4–10)

## 2023-08-26 RX ADMIN — Medication SCH TAB: at 10:06

## 2023-08-26 RX ADMIN — ALUMINUM HYDROXIDE, MAGNESIUM HYDROXIDE, AND SIMETHICONE PRN ML: 200; 200; 20 SUSPENSION ORAL at 06:05

## 2023-08-26 RX ADMIN — HYDROXYZINE PAMOATE PRN MG: 25 CAPSULE ORAL at 10:06

## 2023-08-26 RX ADMIN — HYDROXYZINE PAMOATE PRN MG: 25 CAPSULE ORAL at 18:39

## 2023-08-26 RX ADMIN — METHOCARBAMOL PRN MG: 500 TABLET ORAL at 10:06

## 2023-08-26 RX ADMIN — Medication SCH MG: at 22:20

## 2023-08-26 RX ADMIN — METHOCARBAMOL PRN MG: 500 TABLET ORAL at 22:21

## 2023-08-27 RX ADMIN — HYDROXYZINE PAMOATE PRN MG: 25 CAPSULE ORAL at 20:27

## 2023-08-27 RX ADMIN — METHOCARBAMOL PRN MG: 500 TABLET ORAL at 22:19

## 2023-08-27 RX ADMIN — Medication SCH MG: at 22:15

## 2023-08-27 RX ADMIN — Medication SCH TAB: at 09:37

## 2023-08-28 RX ADMIN — Medication SCH TAB: at 10:33

## 2023-08-28 RX ADMIN — METHOCARBAMOL PRN MG: 500 TABLET ORAL at 10:34

## 2023-08-28 RX ADMIN — Medication SCH MG: at 22:51

## 2023-08-28 RX ADMIN — METHOCARBAMOL PRN MG: 500 TABLET ORAL at 22:53

## 2023-08-28 RX ADMIN — HYDROXYZINE PAMOATE PRN MG: 25 CAPSULE ORAL at 10:34

## 2023-08-29 RX ADMIN — ALUMINUM HYDROXIDE, MAGNESIUM HYDROXIDE, AND SIMETHICONE PRN ML: 200; 200; 20 SUSPENSION ORAL at 22:30

## 2023-08-29 RX ADMIN — Medication SCH MG: at 22:27

## 2023-08-29 RX ADMIN — Medication SCH TAB: at 10:33

## 2023-08-29 RX ADMIN — METHOCARBAMOL PRN MG: 500 TABLET ORAL at 10:34

## 2023-08-29 RX ADMIN — HYDROXYZINE PAMOATE PRN MG: 25 CAPSULE ORAL at 10:34

## 2023-08-29 RX ADMIN — METHOCARBAMOL PRN MG: 500 TABLET ORAL at 17:21

## 2023-08-29 RX ADMIN — FAMOTIDINE SCH MG: 20 TABLET ORAL at 06:27

## 2023-08-29 RX ADMIN — METHOCARBAMOL PRN MG: 500 TABLET ORAL at 22:29

## 2023-08-30 RX ADMIN — FAMOTIDINE SCH MG: 20 TABLET ORAL at 05:26

## 2023-08-30 RX ADMIN — Medication SCH: at 23:33

## 2023-08-30 RX ADMIN — Medication SCH: at 10:15

## 2023-08-30 RX ADMIN — ALUMINUM HYDROXIDE, MAGNESIUM HYDROXIDE, AND SIMETHICONE PRN ML: 200; 200; 20 SUSPENSION ORAL at 06:08

## 2023-08-31 VITALS — RESPIRATION RATE: 16 BRPM

## 2023-08-31 VITALS — HEART RATE: 87 BPM | TEMPERATURE: 97.5 F | SYSTOLIC BLOOD PRESSURE: 116 MMHG | DIASTOLIC BLOOD PRESSURE: 67 MMHG

## 2023-08-31 RX ADMIN — FAMOTIDINE SCH MG: 20 TABLET ORAL at 05:39

## 2023-08-31 RX ADMIN — Medication SCH TAB: at 10:24

## 2023-12-24 ENCOUNTER — EMERGENCY (EMERGENCY)
Facility: HOSPITAL | Age: 58
LOS: 1 days | Discharge: HOME | End: 2023-12-24
Attending: EMERGENCY MEDICINE | Admitting: EMERGENCY MEDICINE
Payer: MEDICAID

## 2023-12-24 VITALS
HEART RATE: 98 BPM | SYSTOLIC BLOOD PRESSURE: 160 MMHG | DIASTOLIC BLOOD PRESSURE: 90 MMHG | RESPIRATION RATE: 16 BRPM | OXYGEN SATURATION: 95 %

## 2023-12-24 VITALS
SYSTOLIC BLOOD PRESSURE: 151 MMHG | HEART RATE: 88 BPM | OXYGEN SATURATION: 97 % | DIASTOLIC BLOOD PRESSURE: 94 MMHG | TEMPERATURE: 99 F | RESPIRATION RATE: 15 BRPM

## 2023-12-24 DIAGNOSIS — F10.120 ALCOHOL ABUSE WITH INTOXICATION, UNCOMPLICATED: ICD-10-CM
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
## 2023-12-24 DIAGNOSIS — R41.82 ALTERED MENTAL STATUS, UNSPECIFIED: ICD-10-CM

## 2023-12-24 DIAGNOSIS — Z98.89 OTHER SPECIFIED POSTPROCEDURAL STATES: Chronic | ICD-10-CM

## 2023-12-24 DIAGNOSIS — S62.91XA UNSPECIFIED FRACTURE OF RIGHT WRIST AND HAND, INITIAL ENCOUNTER FOR CLOSED FRACTURE: ICD-10-CM

## 2023-12-24 DIAGNOSIS — Z88.0 ALLERGY STATUS TO PENICILLIN: ICD-10-CM
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
## 2023-12-24 LAB
GLUCOSE BLDC GLUCOMTR-MCNC: 84 MG/DL — SIGNIFICANT CHANGE UP (ref 70–99)
GLUCOSE BLDC GLUCOMTR-MCNC: 84 MG/DL — SIGNIFICANT CHANGE UP (ref 70–99)

## 2023-12-24 PROCEDURE — 99284 EMERGENCY DEPT VISIT MOD MDM: CPT

## 2023-12-24 NOTE — ED ADULT NURSE REASSESSMENT NOTE - NS ED NURSE REASSESS COMMENT FT1
Received report from LINDA Gomes. Pt sleeping in be comfortably. Responsive to verbal stimuli. Warm blanket provided as requested. No complaints at this time. Pt requested to be left alone to sleep. Safety precaution in place. Will continue to monitor. Received report from LINDA Gmoes. Pt sleeping in be comfortably. Responsive to verbal stimuli. Warm blanket provided as requested. No complaints at this time. Pt requested to be left alone to sleep. Safety precaution in place. Will continue to monitor.

## 2023-12-24 NOTE — ED ADULT NURSE NOTE - CHIEF COMPLAINT QUOTE
patient BIBA from in front of [employer] on [address]; +ETOH/AMS patient BIBA from in front of [employer] on [address]; +ETOH/AMS
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
## 2023-12-24 NOTE — ED PROVIDER NOTE - OBJECTIVE STATEMENT
58-year-old male brought in by EMS for apparent public alcohol intoxication.  He admits to heavy alcohol intoxication and says that this is the fourth ED he has been in today for drinking.  He has no acute medical complaints or injuries.
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
## 2023-12-24 NOTE — ED PROVIDER NOTE - PHYSICAL EXAMINATION
Const: Severe EtOH intox, poor hygiene, unkempt  ENT: Airway patent, protecting airway. Nasal mucosa clear. MMM. No scalp hematoma and no apparent c-spine tenderness.  Eyes: Clear bilaterally, pupils equal, round 3mm  Cardiac: Normal rate, regular rhythm.  Heart sounds S1, S2.  No murmurs, rubs or gallops.  Resp: Breath sounds clear and equal bilaterally.  GI: Abdomen soft, appears non-tender, no guarding.  MSK: No signs of acute trauma or injury.   Neuro: Severe EtOH intox, BOCANEGRA, normal tone, slurred speech, answers simple questions. Gait unsteady.   Skin: No signs of acute trauma or injury.   Psych: Severe EtOH intox, mostly cooperative

## 2023-12-24 NOTE — ED PROVIDER NOTE - NSFOLLOWUPINSTRUCTIONS_ED_ALL_ED_FT
Please get help for alcohol abuse if you drink heavily or use drugs on a regular basis.     [facility] is a good referral line for crisis and substance abuse help.  AA has drop in programs all over the [facility].    Return to the ER for Emergencies.     [facility]: [phone]   [facility] Substance Abuse Services: [phone], option #2   Methadone Maintenance & Ambulatory Opiate Detox: [phone]  Project Outreach: [phone]  [facility]: [phone]  [facility]: [phone]    [facility]: [phone], option #2   [facility]: [phone]    [facility]: [phone]    [facility] Central Intake: [phone]  [facility] Chemical Dependency/Ancillary Withdrawal: [phone]  [facility] Methadone Maintenance: [phone]    [facility]: [phone]  [facility] Addiction Treatment Services: [phone]    [facility] HopeLine: 9-873-1-[facility] Office of Alcoholism and Substance Abuse Services (OASAS): https://www.oasas.[place].gov/providerdirectory/  [facility] for Addiction Services and Psychotherapy Interventions Research (CASPIR)  www.[facility].org     Interested in discussing options to reduce your tobacco use?    [facility] for Tobacco Control:  [phone]  [facility] QUITLINE: 1-569-TD-QUITS ([phone])    Interested in learning more about substance use?      http://rethinkingdrinking.niaaa.nih.gov   https://www.drugabuse.gov/patients-families     Learn more about opioid overdose prevention programs in [place]:  http://www.health.ny.gov/diseases/aids/general/opioid_overdose_prevention/ Please get help for alcohol abuse if you drink heavily or use drugs on a regular basis.     [facility] is a good referral line for crisis and substance abuse help.  AA has drop in programs all over the [facility].    Return to the ER for Emergencies.     [facility]: [phone]   [facility] Substance Abuse Services: [phone], option #2   Methadone Maintenance & Ambulatory Opiate Detox: [phone]  Project Outreach: [phone]  [facility]: [phone]  [facility]: [phone]    [facility]: [phone], option #2   [facility]: [phone]    [facility]: [phone]    [facility] Central Intake: [phone]  [facility] Chemical Dependency/Ancillary Withdrawal: [phone]  [facility] Methadone Maintenance: [phone]    [facility]: [phone]  [facility] Addiction Treatment Services: [phone]    [facility] HopeLine: 4-267-0-[facility] Office of Alcoholism and Substance Abuse Services (OASAS): https://www.oasas.[place].gov/providerdirectory/  [facility] for Addiction Services and Psychotherapy Interventions Research (CASPIR)  www.[facility].org     Interested in discussing options to reduce your tobacco use?    [facility] for Tobacco Control:  [phone]  [facility] QUITLINE: 3-797-IZ-QUITS (424-2564)    Interested in learning more about substance use?      http://rethinkingdrinking.niaaa.nih.gov   https://www.drugabuse.gov/patients-families     Learn more about opioid overdose prevention programs in [place]:  http://www.health.ny.gov/diseases/aids/general/opioid_overdose_prevention/

## 2023-12-24 NOTE — ED ADULT NURSE NOTE - NSFALLRISKINTERV_ED_ALL_ED
Assistance OOB with selected safe patient handling equipment if applicable/Assistance with ambulation/Communicate fall risk and risk factors to all staff, patient, and family/Monitor gait and stability/Monitor for mental status changes and reorient to person, place, and time, as needed/Move patient closer to nursing station/within visual sight of ED staff/Provide visual cue: yellow wristband, yellow gown, etc/Reinforce activity limits and safety measures with patient and family/Toileting schedule using arm’s reach rule for commode and bathroom/Use of alarms - bed, stretcher, chair and/or video monitoring/Bed in lowest position, wheels locked, appropriate side rails in place/Call bell, personal items and telephone in reach/Instruct patient to call for assistance before getting out of bed/chair/stretcher/Non-slip footwear applied when patient is off stretcher/[place] to call system/Physically safe environment - no spills, clutter or unnecessary equipment/Purposeful Proactive Rounding/Room/bathroom lighting operational, light cord in reach/Unable to comprehend Assistance OOB with selected safe patient handling equipment if applicable/Assistance with ambulation/Communicate fall risk and risk factors to all staff, patient, and family/Monitor gait and stability/Monitor for mental status changes and reorient to person, place, and time, as needed/Move patient closer to nursing station/within visual sight of ED staff/Provide visual cue: yellow wristband, yellow gown, etc/Reinforce activity limits and safety measures with patient and family/Toileting schedule using arm’s reach rule for commode and bathroom/Use of alarms - bed, stretcher, chair and/or video monitoring/Bed in lowest position, wheels locked, appropriate side rails in place/Call bell, personal items and telephone in reach/Instruct patient to call for assistance before getting out of bed/chair/stretcher/Non-slip footwear applied when patient is off stretcher/[place] to call system/Physically safe environment - no spills, clutter or unnecessary equipment/Purposeful Proactive Rounding/Room/bathroom lighting operational, light cord in reach/Unable to comprehend

## 2023-12-24 NOTE — ED ADULT TRIAGE NOTE - CHIEF COMPLAINT QUOTE
patient BIBA from in front of [employer] [address]; +ETOH/AMS patient BIBA from in front of [employer] [address]; +ETOH/AMS
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
## 2023-12-24 NOTE — ED ADULT NURSE NOTE - NSFALLCONCLUSION_ED_ALL_ED
1. Have you been to the ER, urgent care clinic since your last visit? Hospitalized since your last visit? no    2. Have you seen or consulted any other health care providers outside of the [address] [place] [address] since your last visit? Include any pap smears or colon screening.  Yes pcp [facility]
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
## 2023-12-24 NOTE — ED PROVIDER NOTE - PATIENT PORTAL LINK FT
You can access the FollowMyHealth Patient Portal offered by [facility] by registering at the following website: [email or web address]. By joining [employer]’s FollowMyHealth portal, you will also be able to view your health information using other applications (apps) compatible with our system. You can access the FollowMyHealth Patient Portal offered by [facility] by registering at the following website: [email or web address]. By joining [employer]’s FollowMyHealth portal, you will also be able to view your health information using other applications (apps) compatible with our system.

## 2023-12-25 RX ORDER — IBUPROFEN 200 MG
600 TABLET ORAL ONCE
Refills: 0 | Status: COMPLETED | OUTPATIENT
Start: 2023-12-25 | End: 2023-12-25

## 2023-12-25 RX ORDER — ACETAMINOPHEN 500 MG
650 TABLET ORAL ONCE
Refills: 0 | Status: COMPLETED | OUTPATIENT
Start: 2023-12-25 | End: 2023-12-25

## 2023-12-25 RX ADMIN — Medication 600 MILLIGRAM(S): at 01:14

## 2023-12-25 RX ADMIN — Medication 650 MILLIGRAM(S): at 01:14

## 2024-01-19 NOTE — ED PROVIDER NOTE - GASTROINTESTINAL NEGATIVE STATEMENT, MLM
88 qulipta prescription sent   no abdominal pain, no bloating, no constipation, no diarrhea, no nausea and no vomiting.

## 2024-01-27 NOTE — ED ADULT TRIAGE NOTE - AS O2 DELIVERY
ASSESSMENT:  Influenza-like illness     - Influenza A/B antigen  - Symptomatic COVID-19 Virus (Coronavirus) by PCR Nose    Discussed she could be treated for covid if test is positive    Influenza A     - benzonatate (TESSALON) 200 MG capsule; Take 1 capsule (200 mg) by mouth 3 times daily as needed for cough     We discussed the limitations of influenza testing and limitations of  antiviral therapy against influenza, that treatment should usually be initiated within 2 days of the start of symptoms and is most critical for persons with weak immunity and chronic respiratory illnesses-  She is day 3-  declined tamiflu     Symptomatic therapy suggested:  Rest, drink lots of fluids,  Acetaminophen / ibuprofen for body aches and fever,  Salt water gargles for sore throat,  OTC anesthetic lozenges for sore throat,  OTC cough medications.   Call or return to clinic prn if these symptoms worsen or fail to improve as anticipated.       Discussed that influenza and similar illnesses are  potent viruses  that can cause damage to airways making increased risk for developing bronchitis or pneumonia.  In severe cases of chest symptoms an  antibiotic can treat the bacterial superinfection, but I explained that the antibiotic is not effective against the influenza or other respiratory viruses.    Note for work    ----------------------------------------------------------------------------------------------------------------------------------     SUBJECTIVE:   Chief Complaint   Patient presents with    Urgent Care     Cough x 4 days     Katina Maza is a 50 year old female who present complaining of flu-like symptoms: fevers, chills, myalgias, headache,  congestion, sore throat and cough for 3 days. Denies  dyspnea /  wheezing but does have congested cough  Has   known exposure to people with influenza-  a co-worker was positive for influenza      Past Medical History:   Diagnosis Date    Anal fissure     comes and goes     Embolism and thrombosis of unspecified site 1/2007    left thigh - extensive    Headache(784.0)      Patient Active Problem List   Diagnosis    Headache    Anal fissure    Backache    Encounter for other general counseling or advice on contraception    CARDIOVASCULAR SCREENING; LDL GOAL LESS THAN 160    Health Care Home       ALLERGIES:  Penicillins    MEDs  NO ACTIVE MEDICATIONS,   mupirocin (BACTROBAN) 2 % external ointment, Apply topically 3 times daily (Patient not taking: Reported on 1/27/2024)    No current facility-administered medications on file prior to visit.      Social History     Tobacco Use    Smoking status: Never    Smokeless tobacco: Never   Substance Use Topics    Alcohol use: Yes     Comment: social       Family History   Problem Relation Age of Onset    Hypertension Father         On medication    Depression Father         On medication    Depression Mother         On medication    Depression Maternal Grandmother     Diabetes No family hx of     Cancer No family hx of          ROS:  CONSTITUTIONAL: fever, chills,   INTEGUMENTARY/SKIN: NEGATIVE for worrisome rashes or lesions  EYES: NEGATIVE for vision changes or irritation  ENT/MOUTH: NEGATIVE for ear, mouth and throat problems     OBJECTIVE  :/82 (BP Location: Right arm, Patient Position: Sitting, Cuff Size: Adult Regular)   Pulse 94   Temp 99.3  F (37.4  C) (Tympanic)   SpO2 96%   Breastfeeding No   GENERAL APPEARANCE: alert, moderate distress, flushed and fatigued,  occasional cough  EYES: EOMI,  PERRL, conjunctiva clear  HENT: ear canals and TM's normal.  Nose and mouth without ulcers, erythema or lesions  NECK: supple, nontender, no lymphadenopathy  RESP: rhonchi bilateral, no wheezing, no rales  CV: regular rates and rhythm, normal S1 S2, no murmur noted  NEURO: Normal strength and tone, sensory exam grossly normal,  normal speech and mentation  SKIN: no suspicious lesions or rashes        room air
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
## 2024-05-06 ENCOUNTER — EMERGENCY (EMERGENCY)
Facility: HOSPITAL | Age: 59
LOS: 1 days | Discharge: HOME | End: 2024-05-06
Admitting: EMERGENCY MEDICINE
Payer: MEDICAID

## 2024-05-06 VITALS
HEART RATE: 113 BPM | OXYGEN SATURATION: 93 % | WEIGHT: 179.9 LBS | DIASTOLIC BLOOD PRESSURE: 84 MMHG | SYSTOLIC BLOOD PRESSURE: 156 MMHG | RESPIRATION RATE: 17 BRPM | TEMPERATURE: 99 F

## 2024-05-06 DIAGNOSIS — Z98.89 OTHER SPECIFIED POSTPROCEDURAL STATES: Chronic | ICD-10-CM

## 2024-05-06 PROCEDURE — 99284 EMERGENCY DEPT VISIT MOD MDM: CPT

## 2024-05-06 NOTE — ED PROVIDER NOTE - NSTIMEPROVIDERCAREINITIATE_GEN_ER
[facility] Refill Guideline reviewed.  Medication meets criteria for refill.     06-Jan-2017 20:35

## 2024-05-06 NOTE — ED ADULT NURSE NOTE - NSFALLHARMRISKINTERV_ED_ALL_ED
bed alarm in place/Communicate risk of Fall with Harm to all staff, patient, and family/Monitor for mental status changes and reorient to person, place, and time, as needed/Move patient closer to nursing station/within visual sight of ED staff/Provide visual cue: red socks, yellow wristband, yellow gown, etc/Reinforce activity limits and safety measures with patient and family/Bed in lowest position, wheels locked, appropriate side rails in place/Call bell, personal items and telephone in reach/Instruct patient to call for assistance before getting out of bed/chair/stretcher/Non-slip footwear applied when patient is off stretcher/[place] to call system/Physically safe environment - no spills, clutter or unnecessary equipment/Purposeful Proactive Rounding/Room/bathroom lighting operational, light cord in reach

## 2024-05-07 VITALS
OXYGEN SATURATION: 98 % | SYSTOLIC BLOOD PRESSURE: 174 MMHG | RESPIRATION RATE: 17 BRPM | HEART RATE: 84 BPM | DIASTOLIC BLOOD PRESSURE: 90 MMHG

## 2024-05-07 NOTE — ED PROVIDER NOTE - NSDCPRINTRESULTS_ED_ALL_ED
31-year-old female presents to the emergency department with known history of vascular disease peripherally. Patient has been evaluated by Dr. Humble Dean who stated the patient needs a femoral bypass. She is complaining of left groin pain radiating into the left medial groin. She states the pain is intolerable. She states it got to the point where she is crawling. She states no nausea vomiting diarrhea abdominal pain or constipation. She states pain starts in the left groin. She denies fevers or chills. The history is provided by the patient. Extremity Weakness   Severity:  Moderate  Onset quality:  Gradual  Duration:  4 days  Timing:  Intermittent  Progression:  Waxing and waning  Chronicity:  Recurrent  Relieved by:  Nothing  Worsened by:  Nothing  Ineffective treatments:  None tried  Associated symptoms: difficulty walking and extremity numbness    Associated symptoms: no abdominal pain, no anorexia, no chest pain, no cough, no diarrhea, no dysuria, no falls, no fever, no foul-smelling urine, no frequency, no headaches, no nausea, no sensory-motor deficit, no shortness of breath, no stroke symptoms, no syncope and no vomiting         Review of Systems   Constitutional: Negative for chills and fever. Respiratory: Negative for cough and shortness of breath. Cardiovascular: Negative for chest pain and syncope. Gastrointestinal: Negative for abdominal pain, anorexia, blood in stool, diarrhea, nausea and vomiting. Genitourinary: Negative for dysuria and frequency. Musculoskeletal: Negative for back pain and falls. Skin: Negative for rash. Neurological: Positive for weakness and numbness. Negative for headaches. All other systems reviewed and are negative. Physical Exam  Constitutional:       General: She is in acute distress. Appearance: Normal appearance. HENT:      Head: Normocephalic and atraumatic.       Nose: Nose normal.   Eyes:      Extraocular Movements: Extraocular movements intact. Pupils: Pupils are equal, round, and reactive to light. Cardiovascular:      Rate and Rhythm: Normal rate and regular rhythm. Pulses:           Dorsalis pedis pulses are detected w/ Doppler on the right side and detected w/ Doppler on the left side. Posterior tibial pulses are detected w/ Doppler on the right side and detected w/ Doppler on the left side. Neurological:      Mental Status: She is alert and oriented to person, place, and time. Procedures     [facility]     ED Course as of    1529 Dr. Katerine Schmitt at bedside to eval and finds dopplerable pulses. He request CTA Aorta with runoff. [CF]      ED Course User Index  [CF] Jaylon Manzanos, DO        --------------------------------------------- PAST HISTORY ---------------------------------------------  Past Medical History:  has a past medical history of Aortoiliac occlusive disease ([address] Atherosclerosis of native arteries of extremity with rest pain ([address] Atherosclerosis of native artery of extremity with ulceration ([address] Atherosclerosis of native artery of left lower extremity with rest pain ([address] Bilateral carotid artery stenosis, Bruit of right carotid artery, CAD (coronary artery disease), Cancer ([address] COPD (chronic obstructive pulmonary disease) ([address] Depression, Femoro-popliteal artery disease ([address] History of tobacco use, Hydrocephalus ([address] Hyperlipidemia, Hypertension, Pain in both feet, PVD (peripheral vascular disease) ([address] PVD (peripheral vascular disease) with claudication ([address] Rheumatoid arthritis ([address] Thyroid disease, and Venous stasis ulcer of left calf with fat layer exposed without varicose veins ([address] Past Surgical History:  has a past surgical history that includes  section; Colonoscopy; Tubal ligation; Dilation and curettage of uterus; and PERIPHERAL PERCUTANEOUS ARTERIAL INTERVENTION (2019).     Social History:  reports that she quit smoking about 2 years ago. Her smoking use included cigarettes. She smoked 0.25 packs per day. She has never used smokeless tobacco. She reports current alcohol use. She reports that she does not use drugs. Family History: family history includes Cancer in her father; Coronary Art Dis in her mother; High Blood Pressure in her mother; Kidney Disease in her mother; Other in her father. The patients home medications have been reviewed. Allergies: Flagyl [metronidazole];  Hornet venom; and Wasp venom    -------------------------------------------------- RESULTS -------------------------------------------------    LABS:  Results for orders placed or performed during the hospital encounter of 01/20/20   Comprehensive Metabolic Panel w/ Reflex to MG   Result Value Ref Range    Sodium 142 132 - 146 mmol/L    Potassium reflex Magnesium 4.0 3.5 - 5.0 mmol/L    Chloride 104 98 - 107 mmol/L    CO2 27 22 - 29 mmol/L    Anion Gap 11 7 - 16 mmol/L    Glucose 95 74 - 99 mg/dL    BUN 30 (H) 8 - 23 mg/dL    CREATININE 0.6 0.5 - 1.0 mg/dL    GFR Non-African American >60 >=60 mL/min/1.73    GFR African American >60     Calcium 9.5 8.6 - 10.2 mg/dL    Total Protein 7.3 6.4 - 8.3 g/dL    Alb 4.2 3.5 - 5.2 g/dL    Total Bilirubin 0.7 0.0 - 1.2 mg/dL    Alkaline Phosphatase 83 35 - 104 U/L    ALT 21 0 - 32 U/L    AST 30 0 - 31 U/L   CBC Auto Differential   Result Value Ref Range    WBC 6.5 4.5 - 11.5 E9/L    RBC 4.07 3.50 - 5.50 E12/L    Hemoglobin 12.9 11.5 - 15.5 g/dL    Hematocrit 40.7 34.0 - 48.0 %    .0 (H) 80.0 - 99.9 fL    MCH 31.7 26.0 - 35.0 pg    MCHC 31.7 (L) 32.0 - 34.5 %    RDW 13.6 11.5 - 15.0 fL    Platelets 406 849 - 340 E9/L    MPV 8.9 7.0 - 12.0 fL    Neutrophils % 80.2 (H) 43.0 - 80.0 %    Immature Granulocytes % 0.2 0.0 - 5.0 %    Lymphocytes % 10.3 (L) 20.0 - 42.0 %    Monocytes % 8.1 2.0 - 12.0 %    Eosinophils % 0.9 0.0 - 6.0 %    Basophils % 0.3 0.0 - 2.0 %    Neutrophils Absolute 5.22 1.80 - 7.30 E9/L    Immature Granulocytes # 0.01 E9/L    Lymphocytes Absolute 0.67 (L) 1.50 - 4.00 E9/L    Monocytes Absolute 0.53 0.10 - 0.95 E9/L    Eosinophils Absolute 0.06 0.05 - 0.50 E9/L    Basophils Absolute 0.02 0.00 - 0.20 E9/L   APTT   Result Value Ref Range    aPTT 31.3 24.5 - 35.1 sec   Protime-INR   Result Value Ref Range    Protime 11.2 9.3 - 12.4 sec    INR 1.0        RADIOLOGY:  Xr Shoulder Right (min 2 Views)    Result Date: 2020  Patient MRN:  [id number] : 1946 Age: 72 years Gender: Female Order Date:  2020 12:38 PM EXAM: XR SHOULDER RIGHT (MIN 2 VIEWS) INDICATION: M25.511 Right shoulder pain, unspecified chronicity  COMPARISON: None FINDINGS:  No fracture dislocation is identified. Glenohumeral articulation is normal.     No acute process     Cta Abdominal Aorta W Bilat Runoff W Contrast    Result Date: 2020  Patient MRN:  [id number] : 1946 Age: 72 years Gender: Female Order Date:  2020 3:30 PM EXAM: CTA ABDOMINAL AORTA W BILAT RUNOFF W CONTRAST Number of images [id number] including MIP coronal and sagittal reconstruction images, and 3-D reconstruction images the aorta with bilateral runoff. Contrast. Isovue-370, 120 mL intravenously. Technique: Low-dose CT  acquisition technique included one of following options; 1 . Automated exposure control, 2. Adjustment of MA and or KV according to patient's size or 3. Use of iterative reconstruction. INDICATION:  concern for vascular insult, hx of claudication concern for vascular insult, hx of claudication COMPARISON: Previous CTA 5/15/2018 FINDINGS: The lung bases demonstrate COPD. Liver, gallbladder, spleen, pancreas, the adrenals and the right kidney appear normal. There is mild hydronephrosis in the left kidney with perinephric edema. A 2 mm stone is identified in the proximal left ureter. An additional nonobstructing 2 mm left renal stone is also present. Degenerative changes are identified in the lumbar spine. Pelvis. Bladder is distended. warrants admission to the hospital Case was discussed with Dr. Ronnie Canchola midlevel who will admit. Counseling:  I have spoken with the patient and discussed todays results, in addition to providing specific details for the plan of care and counseling regarding the diagnosis and prognosis. Their questions are answered at this time and they are agreeable with the plan of admission.    --------------------------------- ADDITIONAL PROVIDER NOTES ---------------------------------  This patient's ED course included: a personal history and physicial examination, re-evaluation prior to disposition, multiple bedside re-evaluations, IV medications, cardiac monitoring and continuous pulse oximetry    This patient has remained hemodynamically stable during their ED course. Diagnosis:  1. Left groin pain    2. Left leg claudication (HCC)    3. Unable to ambulate        Disposition:  Patient's disposition: Admit to telemetry  Patient's condition is stable.              Glenn Brown DO  01/20/20 1958 Patient requests all Lab, Cardiology, and Radiology Results on their Discharge Instructions

## 2024-05-07 NOTE — ED PROVIDER NOTE - PATIENT PORTAL LINK FT
You can access the FollowMyHealth Patient Portal offered by [facility] by registering at the following website: [email or web address]. By joining "[employer]’s FollowMyHealth portal, you will also be able to view your health information using other applications (apps) compatible with our system.

## 2024-05-07 NOTE — ED PROVIDER NOTE - CLINICAL SUMMARY MEDICAL DECISION MAKING FREE TEXT BOX
58-year-old male presents this emergency department for proper intoxication  Patient does not have any head injuries, is alert and x 3  Will monitor patient till clinical sobriety is achieved.
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
## 2024-05-07 NOTE — ED PROVIDER NOTE - NSFOLLOWUPINSTRUCTIONS_ED_ALL_ED_FT
Alcohol Intoxication  Alcohol intoxication occurs when a person no longer thinks clearly or functions well (becomes impaired) after drinking alcohol. Intoxication can occur with even one drink. The level of impairment depends on:    The amount of alcohol the person had.  The person's age, gender, and weight.  How often the person drinks.  Whether the person has other medical conditions, such as diabetes, seizures, or a heart condition.    Alcohol intoxication can range in severity from mild to severe. The condition can be dangerous, especially when caused by drinking large amounts of alcohol in a short period of time (binge drinking) or if the person also took certain prescription medicines or recreational drugs.    What are the signs or symptoms?  Symptoms of mild alcohol intoxication include:    Feeling relaxed or sleepy.  Mild difficulty with:    Coordination.  Speech.  Memory.  Attention.      Symptoms of moderate alcohol intoxication include:    Extreme emotions, such as anger or sadness.  Moderate difficulty with:    Coordination.  Speech.  Memory.  Attention.      Symptoms of severe alcohol intoxication include:    Passing out.  Vomiting.  Confusion.  Slow breathing.  Coma.  Severe difficulty with:    Coordination.  Speech.  Memory.  Attention.      Intoxication, especially in people who are not exposed to alcohol often can progress from mild to severe quickly, and may even cause coma or death.    How is this diagnosed?  This condition may be diagnosed based on:    A medical history.  A physical exam.  A blood test that measures the concentration of alcohol in the blood (blood alcohol content, or CHAITANYA).  Whether there is a smell of alcohol on the breath.    Your health care provider will ask you how much alcohol you drank and what kind of alcohol you had.    How is this treated?  Usually, treatment is not needed for this condition. Most of the effects of alcohol are temporary and go away as the alcohol naturally leaves the body. Your health care provider may recommend monitoring until the alcohol level starts to drop and it is safe to go home. You may also get fluids through an IV tube to help prevent dehydration. If the intoxication is severe, a breathing machine called a ventilator may be needed to support your breathing.    Follow these instructions at home:  Do not drive after drinking alcohol.  Have someone stay with you while you are intoxicated. You should not be left alone.  Stay hydrated. Drink enough fluid to keep your urine clear or pale yellow.  Avoid caffeine because it can dehydrate you.  ImageTake over-the-counter and prescription medicines only as told by your health care provider.  How is this prevented?  To prevent alcohol intoxication:    Limit alcohol intake to no more than 1 drink a day for nonpregnant women and 2 drinks a day for men. One drink equals 12 oz of beer, 5 oz of wine, or 1½ oz of hard liquor.  Do not drink alcohol on an empty stomach.  Avoid drinking alcohol if:    You are under the legal drinking age.  You are pregnant or may be pregnant.  You are taking medicines that should not be taken with alcohol.  Your drinking causes your medical condition to get worse.  You need to drive or perform activities that require your attention.  You have substance use disorder.      To prevent potentially serious complications of alcohol intoxication, seek immediate medical care if you or someone you know has signs of moderate or severe alcohol intoxication. These include:    Moderate or severe difficulty with:    Coordination.  Speech.  Memory.  Attention.    Passing out.  Confusion.  Vomiting.    Do not leave someone alone if he or she is intoxicated.    Contact a health care provider if:  You do not feel better after a few days.  You are having problems at work, at school, or at home due to drinking.  Get help right away if:  You become shaky when you try to stop drinking.  You shake uncontrollably (have a seizure).  You vomit blood. Blood in vomit may look bright red, or it may look like coffee grounds.  You have blood in your stool. Blood in stool may be bright red, or it may make stool appear black and tarry and make it smell bad.  You become light-headed or you faint.  If you ever feel like you may hurt yourself or others, or have thoughts about taking your own life, get help right away. You can go to your nearest emergency department or call:     Your local emergency services (911 in the [place]   A suicide crisis helpline, such as the National Suicide Prevention Lifeline at 1-303.398.2398. This is open 24 hours a day.     This information is not intended to replace advice given to you by your health care provider. Make sure you discuss any questions you have with your health care provider.

## 2024-05-07 NOTE — ED PROVIDER NOTE - PHYSICAL EXAMINATION
General: well developed, well nourished, no distress  Eyes: no scleral injection  Neck: non-tender, full range of motion, supple.   Respiratory: unlabored breathing  Cardiovascular: no central cyanosis  Extremities: normal range of motion, non-tender.  Neurologic: alert, oriented to person, oriented to place, oriented to time.    Skin: normal color.  Negative For: rash  Psychiatric: normal affect, normal insight, normal concentration  Head is atraumtic, normocephalic. Pt is neurologically intact, no focal neurologic deficits. GCS = 15. no raccoon eyes/corrales signs/hemotympanum noted. No focal tenderness on spinous processes of C-spine.
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
## 2024-05-08 DIAGNOSIS — F10.129 ALCOHOL ABUSE WITH INTOXICATION, UNSPECIFIED: ICD-10-CM

## 2024-05-08 DIAGNOSIS — R41.82 ALTERED MENTAL STATUS, UNSPECIFIED: ICD-10-CM

## 2024-05-08 DIAGNOSIS — Z88.0 ALLERGY STATUS TO PENICILLIN: ICD-10-CM

## 2024-05-25 ENCOUNTER — EMERGENCY (EMERGENCY)
Facility: HOSPITAL | Age: 59
LOS: 1 days | Discharge: HOME | End: 2024-05-25
Attending: EMERGENCY MEDICINE | Admitting: EMERGENCY MEDICINE
Payer: MEDICAID

## 2024-05-25 VITALS
HEART RATE: 100 BPM | RESPIRATION RATE: 20 BRPM | DIASTOLIC BLOOD PRESSURE: 74 MMHG | SYSTOLIC BLOOD PRESSURE: 164 MMHG | TEMPERATURE: 98 F | OXYGEN SATURATION: 97 %

## 2024-05-25 DIAGNOSIS — Z98.89 OTHER SPECIFIED POSTPROCEDURAL STATES: Chronic | ICD-10-CM

## 2024-05-25 PROCEDURE — 12031 INTMD RPR S/A/T/EXT 2.5 CM/<: CPT

## 2024-05-25 PROCEDURE — 99284 EMERGENCY DEPT VISIT MOD MDM: CPT | Mod: 25

## 2024-05-25 RX ORDER — MIDAZOLAM HYDROCHLORIDE 1 MG/ML
4 INJECTION, SOLUTION INTRAMUSCULAR; INTRAVENOUS ONCE
Refills: 0 | Status: DISCONTINUED | OUTPATIENT
Start: 2024-05-25 | End: 2024-05-25

## 2024-05-25 RX ADMIN — MIDAZOLAM HYDROCHLORIDE 4 MILLIGRAM(S): 1 INJECTION, SOLUTION INTRAMUSCULAR; INTRAVENOUS at 23:26
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
## 2024-05-25 NOTE — ED ADULT NURSE NOTE - NSFALLRISKINTERV_ED_ALL_ED
Assistance OOB with selected safe patient handling equipment if applicable/Assistance with ambulation/Communicate fall risk and risk factors to all staff, patient, and family/Monitor gait and stability/Monitor for mental status changes and reorient to person, place, and time, as needed/Provide visual cue: yellow wristband, yellow gown, etc/Reinforce activity limits and safety measures with patient and family/Toileting schedule using arm’s reach rule for commode and bathroom/Use of alarms - bed, stretcher, chair and/or video monitoring/Call bell, personal items and telephone in reach/Instruct patient to call for assistance before getting out of bed/chair/stretcher/Non-slip footwear applied when patient is off stretcher/[place] to call system/Physically safe environment - no spills, clutter or unnecessary equipment/Purposeful Proactive Rounding/Room/bathroom lighting operational, light cord in reach

## 2024-05-25 NOTE — ED PROVIDER NOTE - PHYSICAL EXAMINATION
Constitutional:  Slurring speech, intoxicated appearing   HEENT: Airway patent, Nasal mucosa clear. Mouth with normal mucosa. Abrasion to back of head   Eyes: Clear bilaterally, pupils equal, round and reactive to light.  Cardiac: Normal rate, regular rhythm.  Respiratory: Breath sounds clear and equal bilaterally.  GI: Abdomen soft, non-tender, no guarding.  MSK: Spine appears normal, range of motion is not limited, no muscle or joint tenderness. Abrasion to lower back  Neuro: Alert but not oriented, disruptive  Skin: Skin normal color for race, warm, dry and intact. No evidence of rash. Constitutional:  Slurring speech, intoxicated appearing   HEENT: Airway patent, Nasal mucosa clear. Mouth with normal mucosa. 2cm laceration to occiput  Eyes: Clear bilaterally, pupils equal, round and reactive to light.  Cardiac: Normal rate, regular rhythm.  Respiratory: Breath sounds clear and equal bilaterally.  GI: Abdomen soft, non-tender, no guarding.  MSK: Spine appears normal, range of motion is not limited, no muscle or joint tenderness. Abrasion to lower back  Neuro: Alert but not oriented, disruptive  Skin: Skin normal color for race, warm, dry and intact. No evidence of rash.

## 2024-05-25 NOTE — ED PROVIDER NOTE - NSFOLLOWUPINSTRUCTIONS_ED_ALL_ED_FT
PLEASE RETURN IN 10 DAYS FOR STAPLE REMOVAL.    Head Injury, Adult  Three rear views of the head showing how quick, sudden head movements injure the brain.  There are many types of head injuries. Head injuries can be as minor as a small bump, or they can be a serious medical issue. More severe head injuries include:  A jarring injury to the brain (concussion).  A bruise (contusion) of the brain. This means there is bleeding in the brain that can cause swelling.  A cracked skull (skull fracture).  Bleeding in the brain that collects, clots, and forms a bump (hematoma).  After a head injury, most problems occur within the first 24 hours, but side effects may occur up to 7–10 days after the injury. It is important to watch your condition for any changes. You may need to be observed in the emergency department or urgent care, or you may have to stay in the hospital.    What are the causes?  There are many causes of a head injury. Serious head injuries may be caused by car crashes, bicycle or motorcycle crashes, sports injuries, falls, or being struck by an object.    What are the symptoms?  Symptoms of a head injury include a contusion, bump, or bleeding at the site of the injury. Other physical symptoms may include:  Headache.  Nausea or vomiting.  Dizziness.  Blurred or double vision.  Sensitivity to bright lights or loud noises.  Feeling tired.  Trouble waking up.  Severe symptoms such as:  Weakness or numbness on one side of the body.  Slurred speech or swallowing problems.  Loss of consciousness.  Seizures.  Mental symptoms may include:  Irritability.  Confusion and memory problems.  Poor attention and concentration.  Changes in eating or sleeping habits.  Anxiety or depression.  How is this diagnosed?  This condition is diagnosed based on your symptoms and a physical exam. You may also have imaging tests done, such as a CT scan or an MRI.    How is this treated?  Treatment for this condition depends on the severity and type of injury you have. The main goal of treatment is to prevent complications and allow the brain time to heal.    Mild head injury    If you have a mild head injury, you may be sent home, and treatment may include:  Observation. A responsible adult should stay with you for 24 hours after your injury and check on you often.  Physical rest.  Brain rest.  Pain medicines.  Severe head injury    If you have a severe head injury, treatment may include:  Close observation. You may have to stay in the hospital and have:  Frequent physical exams.  Frequent checks of how your brain and nervous system are working.  Your blood pressure and oxygen levels checked.  Medicines to relieve pain, prevent seizures, and decrease brain swelling.  Airway protection and breathing support. This may include using a ventilator.  Monitoring and managing swelling inside the brain.  Brain surgery. Surgery may include:  Removing a collection of blood or blood clots.  Stopping the bleeding.  Removing a part of the skull to make room for the brain to swell.  Follow these instructions at home:  Activity    Rest. Avoid activities that are hard or tiring.  Make sure you get enough sleep.  Let your brain rest by limiting activities that take a lot of thought or attention, such as:  Watching TV.  Playing memory games and doing puzzles.  Job-related work or homework.  Working on the computer, using social media, and texting.  Avoid activities that could cause another head injury, such as playing sports, until your health care provider approves.  Ask your provider when it is safe for you to return to your regular activities, such as work or school.  Ask your provider when you can drive, ride a bicycle, or use machinery. Your ability to react may be slower after a brain injury. Do not do these activities if you are dizzy.  Lifestyle    A sign telling the reader not to drink beer, wine, or hard liquor.  Do not drink alcohol until your provider approves. Do not use drugs. Alcohol and certain drugs may slow your recovery and can put you at risk of further injury.  If it is hard to remember things, write them down.  If you are easily distracted, try to do one thing at a time.  Talk with family members or close friends when making important decisions.  Tell your friends, family, a trusted colleague, and  about your injury, symptoms, and restrictions. Ask them to watch for any problems that are new or get worse.  General instructions    Take over-the-counter and prescription medicines only as told by your provider.  Have a responsible adult stay with you for 24 hours after your head injury. They should watch you for any changes in your symptoms and be ready to get help right away.  Keep all follow-up visits to make sure your needs are being met and catch any new problems early.  How is this prevented?  Avoiding another brain injury is very important. In rare cases, another injury can lead to permanent brain damage, brain swelling, or death. The risk of this is greatest during the first 7–10 days after a head injury. To avoid injuries:  Improve your balance and strength to avoid falls.  Wear a seat belt when you are in a moving vehicle.  Wear a helmet when riding a bicycle, skiing, or doing any other sport that has a risk of injury.  Take safety measures in your home to prevent falls, such as:  Removing clutter and tripping hazards.  Using grab bars in bathrooms and handrails by stairs.  Placing non-slip mats on floors and in bathtubs.  Improving lighting in dim areas.  Where to find more information  Brain Injury Association: biausa.org  Contact a health care provider if:  You have headaches that do not go away.  You have dizziness that does not go away.  You have double vision or vision changes that do not go away.  You have difficulty sleeping.  You have changes in your mood.  You have new symptoms.  Get help right away if:  You have sudden:  Severe headache.  Severe vomiting.  Unequal pupil size. One is bigger than the other.  Vision problems.  Confusion or irritability.  You have a seizure.  Your symptoms get worse.  You have clear or bloody fluid coming from your nose or ears.  These symptoms may be an emergency. Get help right away. Call 911.  Do not wait to see if the symptoms will go away.  Do not drive yourself to the hospital.  This information is not intended to replace advice given to you by your health care provider. Make sure you discuss any questions you have with your health care provider.    Document Revised: 10/05/2023 Document Reviewed: 10/05/2023  Elsevier Patient Education © 2024 [employer].
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
## 2024-05-25 NOTE — ED ADULT NURSE NOTE - OBJECTIVE STATEMENT
Pt BIBA for AMS, admits to ETOH abuse. Pt is responsive to verbal stimuli. Airway is patent and breathing is spontaneous and unlabored. Pt placed in stretcher with bed alarm, stretcher in the lowest position, in view of nurses station. Abrasion noted to back of head, pt not cooperative with history, pt refusing for clothes to be removed. No visible bleeding. Pt sedated as per MD orders.

## 2024-05-25 NOTE — ED PROVIDER NOTE - PATIENT PORTAL LINK FT
You can access the FollowMyHealth Patient Portal offered by [facility] by registering at the following website: [email or web address]. By joining [employer]’s FollowMyHealth portal, you will also be able to view your health information using other applications (apps) compatible with our system.

## 2024-05-26 VITALS
TEMPERATURE: 98 F | HEART RATE: 65 BPM | SYSTOLIC BLOOD PRESSURE: 154 MMHG | OXYGEN SATURATION: 98 % | RESPIRATION RATE: 16 BRPM | DIASTOLIC BLOOD PRESSURE: 90 MMHG

## 2024-05-26 PROCEDURE — 72125 CT NECK SPINE W/O DYE: CPT | Mod: 26,MC

## 2024-05-26 PROCEDURE — 72192 CT PELVIS W/O DYE: CPT | Mod: 26,MC

## 2024-05-26 PROCEDURE — 71250 CT THORAX DX C-: CPT | Mod: 26,MC

## 2024-05-26 PROCEDURE — 70450 CT HEAD/BRAIN W/O DYE: CPT | Mod: 26,MC

## 2024-05-26 NOTE — ED ADULT NURSE REASSESSMENT NOTE - NS ED NURSE REASSESS COMMENT FT1
Returned from ct scan, fall precautions in place, bed alarm on  maintained on pulse oximeter.  pending radiology

## 2024-05-28 DIAGNOSIS — S01.01XA LACERATION WITHOUT FOREIGN BODY OF SCALP, INITIAL ENCOUNTER: ICD-10-CM
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
## 2024-05-28 DIAGNOSIS — S09.90XA UNSPECIFIED INJURY OF HEAD, INITIAL ENCOUNTER: ICD-10-CM

## 2024-05-28 DIAGNOSIS — Z88.0 ALLERGY STATUS TO PENICILLIN: ICD-10-CM

## 2024-05-28 DIAGNOSIS — F10.129 ALCOHOL ABUSE WITH INTOXICATION, UNSPECIFIED: ICD-10-CM
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
## 2024-05-28 DIAGNOSIS — M47.812 SPONDYLOSIS WITHOUT MYELOPATHY OR RADICULOPATHY, CERVICAL REGION: ICD-10-CM

## 2024-05-28 DIAGNOSIS — N40.0 BENIGN PROSTATIC HYPERPLASIA WITHOUT LOWER URINARY TRACT SYMPTOMS: ICD-10-CM

## 2024-12-31 NOTE — ED ADULT TRIAGE NOTE - PAIN RATING/NUMBER SCALE (0-10): ACTIVITY
Chief Complaints and History of Present Illnesses   Patient presents with    Post Op (Ophthalmology) Left Eye     Chief Complaint(s) and History of Present Illness(es)       Post Op (Ophthalmology) Left Eye              Laterality: left eye              Comments    Cuco is here one day post left tube shunt implant. He felt some pain yesterday in left eye but just feels itchy today.     Ray Green COT 9:34 AM December 31, 2024                      
0 (no pain/absence of nonverbal indicators of pain)

## 2025-03-22 ENCOUNTER — EMERGENCY (EMERGENCY)
Facility: HOSPITAL | Age: 60
LOS: 1 days | Discharge: HOME | End: 2025-03-22
Attending: EMERGENCY MEDICINE | Admitting: EMERGENCY MEDICINE
Payer: COMMERCIAL

## 2025-03-22 VITALS
HEART RATE: 74 BPM | OXYGEN SATURATION: 97 % | RESPIRATION RATE: 18 BRPM | SYSTOLIC BLOOD PRESSURE: 158 MMHG | DIASTOLIC BLOOD PRESSURE: 83 MMHG | TEMPERATURE: 97 F | WEIGHT: 160.06 LBS

## 2025-03-22 DIAGNOSIS — Z98.89 OTHER SPECIFIED POSTPROCEDURAL STATES: Chronic | ICD-10-CM

## 2025-03-22 PROCEDURE — 99285 EMERGENCY DEPT VISIT HI MDM: CPT

## 2025-03-22 PROCEDURE — 99283 EMERGENCY DEPT VISIT LOW MDM: CPT

## 2025-03-22 PROCEDURE — 82962 GLUCOSE BLOOD TEST: CPT
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
## 2025-03-23 VITALS
TEMPERATURE: 98 F | OXYGEN SATURATION: 97 % | DIASTOLIC BLOOD PRESSURE: 83 MMHG | HEART RATE: 70 BPM | SYSTOLIC BLOOD PRESSURE: 144 MMHG | RESPIRATION RATE: 17 BRPM

## 2025-03-23 NOTE — ED PROVIDER NOTE - CLINICAL SUMMARY MEDICAL DECISION MAKING FREE TEXT BOX
59M PMH etoh abuse p/w concern for intox.   Pt occasionally cursing, occasionally answering questions. States "stop harassing me." States "Give me a sandwich... you can suck my gibran you white piece of shit... I'm gonna kill you tonight..." Admits to etoh tonight. Denies any other complaints.   Per EMS: [place], [facility] called EMS for intox. Pt was found by EMS seated on subway bench.   Has prior ED visits for etoh.   Mild HTN, other vitals wnl. Exam as above.   ddx: Clinically etoh intox.   Will observe in ED, reassess for sobriety.
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
## 2025-03-23 NOTE — ED PROVIDER NOTE - OBJECTIVE STATEMENT
59M PMH etoh abuse p/w concern for intox.   Pt occasionally cursing, occasionally answering questions. States "stop harassing me." States "Give me a sandwich... you can suck my gibran you white piece of shit... I'm gonna kill you tonight..." Admits to etoh tonight. Denies any other complaints.   Per EMS: Pt was on subway, [facility] called EMS for intox. Pt was found by EMS seated on subway bench.   Has prior ED visits for etoh.

## 2025-03-23 NOTE — ED PROVIDER NOTE - PATIENT PORTAL LINK FT
You can access the FollowMyHealth Patient Portal offered by [facility] by registering at the following website: [email or web address]. By joining [employer]’s FollowMyHealth portal, you will also be able to view your health information using other applications (apps) compatible with our system.

## 2025-03-23 NOTE — ED PROVIDER NOTE - PROGRESS NOTE DETAILS
[place]: Sleeping comfortably, will reassess. [place]: Pt awake and alert. denies any complaints. Requesting cane. Denies SI/HI. Steady gait w/ cane, slightly wide based and very slight limp. Tolerating PO. Clinically sober and not withdrawing, given copy of results, comfortable for dc, outpt PMD f/u.

## 2025-03-23 NOTE — ED ADULT NURSE NOTE - OBJECTIVE STATEMENT
Patient [place] from [place] for intoxication. EMS initiated from bystanders. Patient loud, irritable, cursing at staff. Patient aaox4, admits to ETOH use. Patient breathing even, unlabored, -amu. Patient refusing to be undressed for full examination. MD aware. Plan for observation per MD.

## 2025-03-23 NOTE — ED ADULT NURSE NOTE - NSFALLRISKINTERV_ED_ALL_ED
Communicate fall risk and risk factors to all staff, patient, and family/Provide visual cue: yellow wristband, yellow gown, etc/Reinforce activity limits and safety measures with patient and family/Use of alarms - bed, stretcher, chair and/or video monitoring/Call bell, personal items and telephone in reach/Instruct patient to call for assistance before getting out of bed/chair/stretcher/Non-slip footwear applied when patient is off stretcher/[place] to call system/Physically safe environment - no spills, clutter or unnecessary equipment/Purposeful Proactive Rounding/Room/bathroom lighting operational, light cord in reach

## 2025-03-23 NOTE — ED PROVIDER NOTE - PHYSICAL EXAMINATION
Pt refusing to get undressed (risk of sedation or risk of escalation violence likely outweighs benefits at this time).   Scattered old appearing superficial abrasions/scabs b/l shin region  slight slurred speech  AOB  No spinal ttp, neck FROM. Strength 5/5. No bony ttp, FROM all extremities.

## 2025-03-26 DIAGNOSIS — Z88.0 ALLERGY STATUS TO PENICILLIN: ICD-10-CM
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
## 2025-03-26 DIAGNOSIS — F10.129 ALCOHOL ABUSE WITH INTOXICATION, UNSPECIFIED: ICD-10-CM

## 2025-03-26 DIAGNOSIS — I10 ESSENTIAL (PRIMARY) HYPERTENSION: ICD-10-CM

## 2025-07-14 NOTE — ED ADULT TRIAGE NOTE - PAIN RATING/NUMBER SCALE (0-10): REST
Incoming call from pt requesting a refill for:    Disp Refills Start End     dexmethylphenidate (FOCALIN XR) 10 MG 24 hr capsule 60 capsule 0 6/11/2025 --    Sig - Route: Take 1 capsule by mouth 2 times daily.        Disp Refills Start End     dexmethylphenidate (FOCALIN) 10 MG tablet 60 tablet 0 6/14/2025 --    Sig - Route: Take 1 tablet by mouth 2 times daily. Indications: Attention Deficit Hyperactivity Disorder, F90.0        Disp Refills Start End     buPROPion (WELLBUTRIN SR) 100 MG 12 hr tablet 60 tablet 10 4/10/2025 --    Sig - Route: Take 1 tablet by mouth 2 times daily.        Disp Refills Start End     cloNIDine (CATAPRES) 0.2 MG tablet 60 tablet 11 3/24/2025 --    Sig - Route: Take 1 tablet by mouth in the morning and 1 tablet in the evenin      Rx loaded    Last seen: 6/5/25  Follow up: 8/12 weeks  Cancelled/No show: none  Next visit: 8/13/2025    Pharmacy: [place] [id number]    Controlled Med - Routed to Provider due to NO PROTOCOL. Orders have been prepped according to providers note.     Ordered date: 6/5/25  Last RX dispensed (per PDMP): 6/12/25 Focalin XR 10 mg,  6/16/25 Focalin 10 mg  Last Drug Screen: 1/8/25    Refills denied for Clonidine and Bupropion. Refills on file.      0

## 2025-07-28 ENCOUNTER — EMERGENCY (EMERGENCY)
Facility: HOSPITAL | Age: 60
LOS: 1 days | End: 2025-07-28
Attending: EMERGENCY MEDICINE | Admitting: EMERGENCY MEDICINE
Payer: MEDICAID

## 2025-07-28 VITALS
DIASTOLIC BLOOD PRESSURE: 85 MMHG | SYSTOLIC BLOOD PRESSURE: 137 MMHG | RESPIRATION RATE: 18 BRPM | HEIGHT: 71 IN | TEMPERATURE: 97 F | WEIGHT: 169.98 LBS | OXYGEN SATURATION: 97 % | HEART RATE: 80 BPM

## 2025-07-28 DIAGNOSIS — Z98.89 OTHER SPECIFIED POSTPROCEDURAL STATES: Chronic | ICD-10-CM

## 2025-07-28 PROCEDURE — 99283 EMERGENCY DEPT VISIT LOW MDM: CPT

## 2025-07-28 NOTE — ED PROVIDER NOTE - PATIENT PORTAL LINK FT
You can access the FollowMyHealth Patient Portal offered by [facility] by registering at the following website: [email or web address]. By joining [facility]’s FollowMyHealth portal, you will also be able to view your health information using other applications (apps) compatible with our system.

## 2025-07-28 NOTE — ED ADULT TRIAGE NOTE - CHIEF COMPLAINT QUOTE
[employer]. Pt admits to drinking etoh today. Pt denies further at triage. No obvious injuries observed or reported.

## 2025-07-28 NOTE — ED PROVIDER NOTE - NSTIMEPROVIDERCAREINITIATE_GEN_ER
Identified pt with two pt identifiers(name and ). Reviewed record in preparation for visit and have obtained necessary documentation. Chief Complaint   Patient presents with    Ear Pain     x1 day, not as bad as yesterday        Vitals:    22 0950   BP: 133/75   Pulse: 100   Resp: 18   Temp: 98 °F (36.7 °C)   TempSrc: Oral   SpO2: 98%   Weight: 165 lb (74.8 kg)   Height: 5' 4\" (1.626 m)   PainSc:   0 - No pain       Health Maintenance Due   Topic    Cervical cancer screen     Flu Vaccine (1)       Coordination of Care Questionnaire:  :   1) Have you been to an emergency room, urgent care, or hospitalized since your last visit? If yes, where when, and reason for visit? no       2. Have seen or consulted any other health care provider since your last visit? If yes, where when, and reason for visit? NO      Patient is accompanied by self I have received verbal consent from Karthik Gill to discuss any/all medical information while they are present in the room. 28-Jul-2025 18:52
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
## 2025-07-28 NOTE — ED ADULT NURSE NOTE - NS ED NURSE DISCH DISPOSITION
Pablo is a 42 year old female here for an obstetrics check.  She is      .  Gestational age: 28w0d      She denies bleeding.  She denies cramping  She reports fetal movement.  Pt states she feels the baby move.  It is mostly at night.  She states they baby has never been exteremly active.  She has not notice a decrease in movement.    See Prenatal Flowsheet for additional comments.   Discharged
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
## 2025-07-30 DIAGNOSIS — Z88.0 ALLERGY STATUS TO PENICILLIN: ICD-10-CM

## 2025-07-30 DIAGNOSIS — F10.129 ALCOHOL ABUSE WITH INTOXICATION, UNSPECIFIED: ICD-10-CM

## 2025-08-17 ENCOUNTER — EMERGENCY (EMERGENCY)
Facility: HOSPITAL | Age: 60
LOS: 1 days | End: 2025-08-17
Attending: STUDENT IN AN ORGANIZED HEALTH CARE EDUCATION/TRAINING PROGRAM | Admitting: EMERGENCY MEDICINE
Payer: MEDICAID

## 2025-08-17 VITALS
SYSTOLIC BLOOD PRESSURE: 114 MMHG | DIASTOLIC BLOOD PRESSURE: 56 MMHG | OXYGEN SATURATION: 96 % | HEIGHT: 71 IN | HEART RATE: 67 BPM | WEIGHT: 152.12 LBS | TEMPERATURE: 98 F | RESPIRATION RATE: 17 BRPM

## 2025-08-17 DIAGNOSIS — Z98.89 OTHER SPECIFIED POSTPROCEDURAL STATES: Chronic | ICD-10-CM

## 2025-08-17 DIAGNOSIS — F10.929 ALCOHOL USE, UNSPECIFIED WITH INTOXICATION, UNSPECIFIED: ICD-10-CM

## 2025-08-17 DIAGNOSIS — R41.82 ALTERED MENTAL STATUS, UNSPECIFIED: ICD-10-CM
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
## 2025-08-17 DIAGNOSIS — Z88.0 ALLERGY STATUS TO PENICILLIN: ICD-10-CM

## 2025-08-17 PROCEDURE — 99222 1ST HOSP IP/OBS MODERATE 55: CPT
